# Patient Record
Sex: FEMALE | Race: WHITE | URBAN - METROPOLITAN AREA
[De-identification: names, ages, dates, MRNs, and addresses within clinical notes are randomized per-mention and may not be internally consistent; named-entity substitution may affect disease eponyms.]

---

## 2017-10-26 ENCOUNTER — HOSPITAL ENCOUNTER (OUTPATIENT)
Dept: MAMMOGRAPHY | Age: 62
Discharge: HOME | End: 2017-10-26
Attending: FAMILY MEDICINE | Admitting: FAMILY MEDICINE

## 2017-10-26 DIAGNOSIS — Z12.31 ENCOUNTER FOR SCREENING MAMMOGRAM FOR BREAST CANCER: ICD-10-CM

## 2024-02-06 ENCOUNTER — OFFICE VISIT (OUTPATIENT)
Age: 69
End: 2024-02-06

## 2024-02-06 VITALS
HEIGHT: 67 IN | DIASTOLIC BLOOD PRESSURE: 76 MMHG | SYSTOLIC BLOOD PRESSURE: 176 MMHG | OXYGEN SATURATION: 86 % | RESPIRATION RATE: 16 BRPM | BODY MASS INDEX: 39.3 KG/M2 | TEMPERATURE: 99.4 F | WEIGHT: 250.4 LBS | HEART RATE: 98 BPM

## 2024-02-06 DIAGNOSIS — R05.9 COUGH, UNSPECIFIED TYPE: Primary | ICD-10-CM

## 2024-02-06 ASSESSMENT — ENCOUNTER SYMPTOMS: COUGH: 1

## 2024-02-07 NOTE — PATIENT INSTRUCTIONS
NEED TO GO TO EMERGENCY ROOM FOR FURTHER EVALUATION. HER OXYGEN SATURATION LOW, HER CHEST WHEEZING MUCH

## 2024-02-07 NOTE — PROGRESS NOTES
Melanie Tobin (:  1955) is a 69 y.o. female,New patient, here for evaluation of the following chief complaint(s):  Cough (Cough, sinus issues, headache, sore throat chest and back pain from coughing x 2 days.)      ASSESSMENT/PLAN:  1. Cough, unspecified type    - POCT Influenza A/B DNA (Alere i) REFUSED TEST  - POCT COVID-19 Rapid, NAAT REFUSED TEST   -NEED TO GO TO EMERGENCY ROOM FOR FURTHER EVALUATION    Return if symptoms worsen or fail to improve.    SUBJECTIVE/OBJECTIVE:  PRESENT TO CLINIC WITH COUGH,FEVER, AND  CONGESTION FOR 2 DAYS. REFUSED FLU AND COVID TEST       History provided by:  Patient  Cough        Vitals:    24 1928   BP: (!) 176/76   Pulse: 98   Resp: 16   Temp: 99.4 °F (37.4 °C)   SpO2: (!) 86%   Weight: 113.6 kg (250 lb 6.4 oz)   Height: 1.702 m (5' 7\")       Review of Systems   HENT:  Positive for congestion.    Respiratory:  Positive for cough.        Physical Exam  Constitutional:       Appearance: Normal appearance.   HENT:      Head: Normocephalic and atraumatic.      Nose: Nose normal.      Mouth/Throat:      Mouth: Mucous membranes are moist.   Eyes:      Pupils: Pupils are equal, round, and reactive to light.   Pulmonary:      Breath sounds: Wheezing and rhonchi present.   Musculoskeletal:         General: Normal range of motion.      Cervical back: Normal range of motion and neck supple.   Neurological:      Mental Status: She is alert and oriented to person, place, and time.           An electronic signature was used to authenticate this note.    --Sonny Ordoñez DO

## 2024-09-22 ENCOUNTER — CLINICAL DOCUMENTATION (OUTPATIENT)
Dept: PHYSICAL THERAPY | Age: 69
End: 2024-09-22

## 2024-09-22 PROBLEM — I63.9 ACUTE CVA (CEREBROVASCULAR ACCIDENT) (HCC): Status: ACTIVE | Noted: 2024-09-22

## 2024-09-22 RX ORDER — UBIDECARENONE 75 MG
100 CAPSULE ORAL DAILY
Status: DISPENSED | OUTPATIENT
Start: 2024-09-23

## 2024-09-22 RX ORDER — LANOLIN ALCOHOL/MO/W.PET/CERES
3 CREAM (GRAM) TOPICAL NIGHTLY PRN
Status: DISCONTINUED | OUTPATIENT
Start: 2024-09-22 | End: 2024-09-25

## 2024-09-22 RX ORDER — GLUCAGON 1 MG/ML
1 KIT INJECTION PRN
Status: ACTIVE | OUTPATIENT
Start: 2024-09-22

## 2024-09-22 RX ORDER — ASPIRIN 81 MG/1
81 TABLET, CHEWABLE ORAL DAILY
Status: DISPENSED | OUTPATIENT
Start: 2024-09-23

## 2024-09-22 RX ORDER — ACETAMINOPHEN 325 MG/1
650 TABLET ORAL EVERY 4 HOURS PRN
Status: ACTIVE | OUTPATIENT
Start: 2024-09-22

## 2024-09-22 RX ORDER — AMLODIPINE BESYLATE 5 MG/1
5 TABLET ORAL DAILY
Status: DISCONTINUED | OUTPATIENT
Start: 2024-09-23 | End: 2024-09-25

## 2024-09-22 RX ORDER — INSULIN GLARGINE 100 [IU]/ML
11 INJECTION, SOLUTION SUBCUTANEOUS NIGHTLY
Status: DISCONTINUED | OUTPATIENT
Start: 2024-09-22 | End: 2024-09-26

## 2024-09-22 RX ORDER — HEPARIN SODIUM 5000 [USP'U]/ML
5000 INJECTION, SOLUTION INTRAVENOUS; SUBCUTANEOUS EVERY 8 HOURS SCHEDULED
Status: DISCONTINUED | OUTPATIENT
Start: 2024-09-22 | End: 2024-09-27

## 2024-09-22 RX ORDER — ONDANSETRON 4 MG/1
4 TABLET, ORALLY DISINTEGRATING ORAL EVERY 8 HOURS PRN
Status: ACTIVE | OUTPATIENT
Start: 2024-09-22

## 2024-09-22 RX ORDER — HYDRALAZINE HYDROCHLORIDE 10 MG/1
10 TABLET, FILM COATED ORAL EVERY 6 HOURS PRN
Status: ACTIVE | OUTPATIENT
Start: 2024-09-22

## 2024-09-22 RX ORDER — ATORVASTATIN CALCIUM 80 MG/1
80 TABLET, FILM COATED ORAL NIGHTLY
Status: DISPENSED | OUTPATIENT
Start: 2024-09-22

## 2024-09-22 RX ORDER — BISACODYL 10 MG
10 SUPPOSITORY, RECTAL RECTAL DAILY PRN
Status: ACTIVE | OUTPATIENT
Start: 2024-09-22

## 2024-09-22 RX ORDER — INSULIN LISPRO 100 [IU]/ML
0-4 INJECTION, SOLUTION INTRAVENOUS; SUBCUTANEOUS NIGHTLY
Status: ACTIVE | OUTPATIENT
Start: 2024-09-22

## 2024-09-22 RX ORDER — DEXTROSE MONOHYDRATE 100 MG/ML
INJECTION, SOLUTION INTRAVENOUS CONTINUOUS PRN
Status: ACTIVE | OUTPATIENT
Start: 2024-09-22

## 2024-09-22 RX ORDER — POLYETHYLENE GLYCOL 3350 17 G/17G
17 POWDER, FOR SOLUTION ORAL 2 TIMES DAILY
Status: DISPENSED | OUTPATIENT
Start: 2024-09-22

## 2024-09-22 RX ORDER — INSULIN LISPRO 100 [IU]/ML
0-4 INJECTION, SOLUTION INTRAVENOUS; SUBCUTANEOUS
Status: DISPENSED | OUTPATIENT
Start: 2024-09-22

## 2024-09-22 NOTE — CARE COORDINATION
encephalopathy  --underlying dementia/cognitive impairment? Neuro recommends OP neuropsych testing  --EEG w/ some slowing but no epileptiform activity  --B12 is low normal -- replacing. Thiamine level pending. ANCAs pending     Ammonia level WNL. UA negative. UDS negative. TSH WNL     Accelerated HTN   --holding ARB for now  --started low-dose norvasc     Acute kidney injury -- on CKD? Baseline unknown  --trend labs. Stable today ~1.7     DM2  --SSI  --poorly controlled -- lantus dose increased      Diet at discharge from previous hospital:    Diet Consistent Carb 60 g/meal     Anticoagulation at discharge from previous hospital:    Heparin 5,000u Q8H SQ    Code Status from previous hospital:    Full        AJ SchwartzN, .[phone]

## 2024-09-23 ENCOUNTER — HOSPITAL ENCOUNTER (INPATIENT)
Age: 69
LOS: 10 days | Discharge: HOME HEALTH | DRG: 057 | End: 2024-10-03
Attending: STUDENT IN AN ORGANIZED HEALTH CARE EDUCATION/TRAINING PROGRAM | Admitting: STUDENT IN AN ORGANIZED HEALTH CARE EDUCATION/TRAINING PROGRAM
Payer: MEDICARE

## 2024-09-23 LAB
GLUCOSE BLD-MCNC: 209 MG/DL (ref 70–99)
GLUCOSE BLD-MCNC: 99 MG/DL (ref 70–99)
PERFORMED ON: ABNORMAL
PERFORMED ON: NORMAL

## 2024-09-23 PROCEDURE — 6360000002 HC RX W HCPCS: Performed by: STUDENT IN AN ORGANIZED HEALTH CARE EDUCATION/TRAINING PROGRAM

## 2024-09-23 PROCEDURE — 6370000000 HC RX 637 (ALT 250 FOR IP): Performed by: STUDENT IN AN ORGANIZED HEALTH CARE EDUCATION/TRAINING PROGRAM

## 2024-09-23 PROCEDURE — 1280000000 HC REHAB R&B

## 2024-09-23 RX ADMIN — HEPARIN SODIUM 5000 UNITS: 5000 INJECTION INTRAVENOUS; SUBCUTANEOUS at 20:42

## 2024-09-23 RX ADMIN — ATORVASTATIN CALCIUM 80 MG: 80 TABLET, FILM COATED ORAL at 20:43

## 2024-09-23 RX ADMIN — INSULIN GLARGINE 11 UNITS: 100 INJECTION, SOLUTION SUBCUTANEOUS at 20:35

## 2024-09-23 NOTE — PLAN OF CARE
ARU PATIENT TREATMENT PLAN  [facility]  [address] [place] [address]  [phone]      Melanie Tobin    : 1955  [facility] #: [id number]  MRN: [id number]  PHYSICIAN:  Liyah Meeks MD  Primary Problem    Patient Active Problem List   Diagnosis    Chest pain    Headache    Diabetes mellitus type 2, uncontrolled    Acute CVA (cerebrovascular accident) (HCC)       Rehabilitation Diagnosis:      #. Left frontal and thalamic ischemic CVA  - Suspected etiology is small vessel disease.  - MRI w/ punctate acute cortical infarct in the parasagittal left frontal lobe and in the medial left thalamus.  - Continue aspirin 81 mg daily, atorvastatin 80 mg nightly  - Optimization of comorbid HTN and DM2  - SLP for cog/language     #. Encephalopathy  - Right temporal slowing noted on EEG, but no epileptiform activity  - LP deferred at outside hospital  - Continue vitamin B12 supplementation  - ANCA panel, thiamine levels - pending (from [facility])  - Outpatient neuropsychological testing has been recommended     #. DARA vs CKD?  - Cr 1.7 at [facility], unknown baseline  - Recheck BMP tomorrow,      #.  HTN  - Continue amlodipine 5 mg daily  -Hydralazine 10 mg every 6 hours as needed for SBP >180 mmHg     #. DM2  - Lantus 11 units nightly  - SSI     ADMIT DATE:2024    Patient Goals: Return to PLOF.  Admitting Impairments: Stroke - 1.2 - Right Body Involvement (Left Brain)  Activities: Impaired Eating, Hygiene, Toileting, Bathing, Dressing, Bed Mobility, Transfers, Ambulation, Stairs, and Endurance.  Participation: Prior to admission patient was living at home with spouse, was independent with all mobility and activities, was an active .     CARE PLAN     NURSING:  Melanie Tobin while on this unit will:  [x] Be continent of bowel and bladder     [x] Have an adequate number of bowel movements  [x] Urinate with no urinary retention >300ml in bladder  [] Complete bladder protocol with ricks  lower body ADL at supervision   Patient will complete toileting at supervision   Patient will complete functional transfers at supervision   Patient to gather and transport IADL items at supervision     These goals were reviewed with this patient at the time of assessment and Melanie Tobin is in agreement    Plan of Care:  Pt to be seen 5 out of 7 days per week per ARU protocol ( 60 minutes with OT)     SPEECH THERAPY: Goals will be left blank if speech is not following this patient.  Patient Goals: Pt unable to express goals    Time Frame: 2-3 weeks      Pt will complete auditory comprehension tasks with 80% accuracy.   Pt will complete expressive language tasks with 80% accuracy or min cues   Pt will verbalize basic information (personal history etc.) with 80% accuracy or min cues using any communication modality.  Patient will complete verbal description and word retrieval tasks with 80% accuracy or given min verbal cues  Patient will demonstrate insight into limitations and impact on daily functioning with min cues.    Plan of Care:  Pt to be seen 5 out of 7 days per week per ARU protocol ( 60 minutes with SLP)    Therapy Treatments will include:  [x]  therapeutic exercises    [x]  gait training     [x]  neuromuscular re-ed                            [x]  transfer training             [x] community reintegration    [x] bed mobility                          []  w/c mobility and training  [x]  self care    [x]home mgmt    [x]  cognitive training            [x]  energy conservation        []  dysphagia tx    [x]  speech/language/communication therapy   [x]  group therapy    [x]  patient/family education    [] Other:    CASE MANAGEMENT:  Goals:  Assist patient/family with discharge planning, patient/family counseling, and coordination with insurance during ARU stay.       Melanie Tobin will be seen a minimum of 3 hours of therapy per day, a minimum of 5 out of 7 days per week  (please see above for specific

## 2024-09-23 NOTE — PROGRESS NOTES
Patient was admitted to room 4903 at 1500.  Patient was oriented to the Call Light, Phone, TV, Thermostat, Bed Controls, Bathroom and Emergency Cord.  Patient verbalized and demonstrated understanding of all.  Patient was also given an overview of Unit Routines for Acute Rehab, including the patient's rights and responsibilities as well as the CMS IRF [employer] Privacy Act Statement providing notice of data collection.  Patient states that their normal bowel regime is pt unable to recall . Meal times were explained, including how to order food.  The white board, (which is posted on the wall by the door is used for communication) has the Therapy Scheduled that is posted each day along with the name of your doctor, nurse, and therapist for your convenience.  We recommend any family that will be care givers or any care givers the patient has, take part in therapy.  We have no set visiting hours, we suggest non-caregiver friends and family visitors come after therapy (at 4 PM or later) to allow patient to rest in between sessions.      In conjunction with the patient and patient’s family, this nurse worked to establish a tailored Fall TIPS plan to ensure patient safety and compliance:    Falls TIPS Completion    Patient identified as increased risk for harm if fall:  [x] Yes     Fall Risks  History of Falls:    [] Yes   Medication Side Effects:   [x] Yes   Walking Aid:    [x] Yes   IV Pole or Equipment:   [] Yes   Unsteady Walk:     [x] Yes   May Forget or Choose Not to Call: [x] Yes     Fall Interventions   Communicate Recent Fall and/or Risk of Harm: [x] Yes   Walking Aids:  Crutches: [] Yes   Cane: [] Yes   Walker: [x] Yes   IV Assistance When Walking: [] Yes   Toileting Schedule: Every 2 Hours  Bedpan:   [] Yes   Assist to Commode: [] Yes   Assist to Bathroom: [x] Yes   Bed Alarm On: [] Yes   Assistance Out of Bed:  Bedrest: [] Yes   1 Person: [x] Yes   2 People: [] Yes

## 2024-09-23 NOTE — PROGRESS NOTES
2 (possible score 0-27, or enter 99 if unable to complete (if symptom frequency (column 2) is blank for 3 or more items).   9     Social Isolation  \"How often do you feel lonely or isolated from those around you?\"  [] 0.  Never  [] 1.  Rarely  [] 2.  Sometimes  [] 3.  Often  [] 4.  Always  [] 7.  Patient declines to respond  [x] 8.  Patient unable to respond    Pain Effect on Sleep  \"Over the past 5 days, how much of the time has pain made it hard for you to sleep at night?\"  []  0.  Does not apply - I have not had any pain or hurting in the past 5 days  []  1.  Rarely or not at all  []  2.  Occasionally  []  3.  Frequently  []  4.  Almost constantly  [x]  8.  Unable to answer    **If the patient answers \"0.  Does not apply\" to this question, skip the next two \"Pain Effect...\" questions**    Pain Interference with Therapy Activities  \"Over the past 5 days, how often have you limited your participation in rehabilitation therapy sessions due to pain?\"  []  0.  Does not apply - I have not received rehabilitation therapy in the past 5 days  []  1.  Rarely or not at all  []  2.  Occasionally  []  3.  Frequently  []  4.  Almost constantly  [x]  8.  Unable to answer    Pain Interference with Day-to-Day Activities:  \"Over the past 5 days, how often have you limited your day-to-day activities (excluding rehabilitation therapy session)?\"  []  1.  Rarely or not at all  []  2.  Occasionally  []  3.  Frequently  []  4.  Almost constantly  [x]  8.  Unable to answer    Nutritional Approaches  Check all of the following nutritional approaches that apply on admission:  []  A.  Parenteral/IV feeding (including IV fluids if needed for hydration, but not as part of dialysis/chemo)  []  B.  Feeding tube (e.g., nasogastric or abdominal (PEG))  []  C.  Mechanically altered diet - requires change in texture of food or liquids (e.g., pureed food, thickened liquids)  [x]  D.  Therapeutic diet (e.g., low salt, diabetic, low cholesterol)  []   Z.  None of the above    High Risk Drug Classes:  Use and Indication    Is taking: Check if the pt is taking any medications by pharmacological classification, not how it is used, in the following classes  Indication noted: If column 1 is checked, check if there is an indication noted for all meds in the drug class Is taking  (check all that apply) Indication noted (check all that apply)   Antipsychotic [] []   Anticoagulant [x] [x]   Antibiotic [] []   Opioid [] []   Antiplatelet [x] [x]   Hypoglycemic (including insulin) [x] [x]   None of the above []     Special Treatments, Procedures, and Programs    Check all of the following treatments, procedures, and programs that apply on admission. On admission (check all that apply)   Cancer Treatments   A1. Chemotherapy []           A2. IV []           A3. Oral []           A10. Other []   B1. Radiation []   Respiratory Therapies   C1. Oxygen Therapy []           C2. Continuous (continuously for at least 14 hours per day) []           C3. Intermittent []           C4. High-concentration []   D1. Suctioning (Does not include oral suctioning) []           D2. Scheduled []           D3. As needed []   E1. Tracheostomy Care []   F1. Invasive Mechanical Ventilator (ventilator or respirator) []   G1. Non-invasive Mechanical Ventilator []           G2. BiPAP []           G3. CPAP []   Other   H1. IV Medications (Do not include sub Q pumps, flushes, Dextrose 50% or lactated ringers) []           H2. Vasoactive medications []           H3. Antibiotics []           H4. Anticoagulation []           H10. Other []   I1. Transfusions []   J1. Dialysis []           J2. Hemodialysis []           J3. Peritoneal dialysis []   O1. IV access (including a catheter in a vein) []           O2. Peripheral []           O3. Midline []           O4. Central (PICC, tunneled, port) []      None of the above (select if no Cancer, Respiratory, or Other boxes are checked) [x]     The above items have

## 2024-09-23 NOTE — PROGRESS NOTES
4 Eyes Skin Assessment     NAME:  Melanie Tobin  YOB: 1955  MEDICAL RECORD NUMBER:  [id number]    The patient is being assessed for  Admission    I agree that at least one RN has performed a thorough Head to Toe Skin Assessment on the patient. ALL assessment sites listed below have been assessed.      Areas assessed by both nurses:    Head, Face, Ears, Shoulders, Back, Chest, Arms, Elbows, Hands, Sacrum. Buttock, Coccyx, Ischium, Legs. Feet and Heels, and Under Medical Devices         Does the Patient have a Wound? No noted wound(s)       Wally Prevention initiated by RN: No  Wound Care Orders initiated by RN: No    Pressure Injury (Stage 3,4, Unstageable, DTI, NWPT, and Complex wounds) if present, place Wound referral order by RN under : No    New Ostomies, if present place, Ostomy referral order under : No     Nurse 1 eSignature: Electronically signed by Marah Crespo RN on 9/23/24 at 3:24 PM EDT    **SHARE this note so that the co-signing nurse can place an eSignature**    Nurse 2 eSignature: Electronically signed by Jacqueline Kerns RN on 9/23/24 at 3:44 PM EDT

## 2024-09-24 LAB
GLUCOSE BLD-MCNC: 143 MG/DL (ref 70–99)
GLUCOSE BLD-MCNC: 175 MG/DL (ref 70–99)
GLUCOSE BLD-MCNC: 195 MG/DL (ref 70–99)
GLUCOSE BLD-MCNC: 222 MG/DL (ref 70–99)
PERFORMED ON: ABNORMAL

## 2024-09-24 PROCEDURE — 1280000000 HC REHAB R&B

## 2024-09-24 PROCEDURE — 97116 GAIT TRAINING THERAPY: CPT

## 2024-09-24 PROCEDURE — 6370000000 HC RX 637 (ALT 250 FOR IP): Performed by: STUDENT IN AN ORGANIZED HEALTH CARE EDUCATION/TRAINING PROGRAM

## 2024-09-24 PROCEDURE — 92610 EVALUATE SWALLOWING FUNCTION: CPT

## 2024-09-24 PROCEDURE — 92523 SPEECH SOUND LANG COMPREHEN: CPT

## 2024-09-24 PROCEDURE — 97162 PT EVAL MOD COMPLEX 30 MIN: CPT

## 2024-09-24 PROCEDURE — 6360000002 HC RX W HCPCS: Performed by: STUDENT IN AN ORGANIZED HEALTH CARE EDUCATION/TRAINING PROGRAM

## 2024-09-24 PROCEDURE — 97166 OT EVAL MOD COMPLEX 45 MIN: CPT

## 2024-09-24 PROCEDURE — 97535 SELF CARE MNGMENT TRAINING: CPT

## 2024-09-24 PROCEDURE — 97530 THERAPEUTIC ACTIVITIES: CPT

## 2024-09-24 RX ADMIN — INSULIN GLARGINE 11 UNITS: 100 INJECTION, SOLUTION SUBCUTANEOUS at 21:40

## 2024-09-24 RX ADMIN — HEPARIN SODIUM 5000 UNITS: 5000 INJECTION INTRAVENOUS; SUBCUTANEOUS at 05:41

## 2024-09-24 RX ADMIN — POLYETHYLENE GLYCOL 3350 17 G: 17 POWDER, FOR SOLUTION ORAL at 09:17

## 2024-09-24 RX ADMIN — ASPIRIN 81 MG: 81 TABLET, CHEWABLE ORAL at 09:17

## 2024-09-24 RX ADMIN — POLYETHYLENE GLYCOL 3350 17 G: 17 POWDER, FOR SOLUTION ORAL at 21:40

## 2024-09-24 RX ADMIN — ATORVASTATIN CALCIUM 80 MG: 80 TABLET, FILM COATED ORAL at 21:40

## 2024-09-24 RX ADMIN — AMLODIPINE BESYLATE 5 MG: 5 TABLET ORAL at 09:17

## 2024-09-24 RX ADMIN — VITAM B12 100 MCG: 100 TAB at 09:17

## 2024-09-24 NOTE — PROGRESS NOTES
regarding role of SLP, results of assessment, recommendations and general speech pathology plan of care.   Learning Assessment: Pt requires ongoing learning   Pt with limited comprehension     Assessment  Impairments Requiring Therapeutic Intervention: Global Aphasia   Prognosis: fair - severity of deficit    Clinical Assessment:  Pt presents with 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 Pt demonstrates moderate impairments in basic comprehension tasks and severe impairments in more complex comprehension tasks. Pt with error patterns in prolonged responses which required simplification and repetition. Pt demonstrates moderate impairments in basic verbal expression tasks and severe impairments in more complex verbal expression tasks. Pt demonstrates accurate word production with a non-fluent pattern and impairments in word finding. Cognitive domains seemingly impacted by pt's language based deficits are attention, memory, and orientation. Dysphagia assessment also completed this date. Swallow mechanism appears functional, no further dysphagia indicated. Pt would benefit from participation in speech therapy services to improve comprehension and expression.       Diet Solids Recommendation:   Liquid Consistency Recommendation:   Recommended Form of Meds:   Regular texture diet     Thin liquids     Meds whole with water           Plan  Frequency: 60 minutes/day; 5 days per week, as tolerated, until goals met, or discharged from ARU.  Therapeutic Interventions:  Expressive/ Receptive Language intervention , Compensatory Cognitive intervention , and Patient/ Family education     Discharge  Barriers to discharge: May need additional assistance to manage medications and/or finances (assistance/supervision)  Inability to effectively communicate in emergent situations (ex: calling 911, stating name, reduced intelligibility, etc)  Discharge Recommendations: 24 hour supervision  and TBD   Continued SLP at Discharge: Yes     Goals  Patient Goals: Pt unable to express goals    Time Frame: 2-3 weeks      Pt will complete auditory comprehension tasks with 80% accuracy.   Pt will complete expressive language tasks with 80% accuracy or min cues   Pt will verbalize basic information (personal history etc.) with 80% accuracy or min cues using any communication modality.  Patient will complete verbal description and word retrieval tasks with 80% accuracy or given min verbal cues  Patient will demonstrate insight into limitations and impact on daily functioning with min cues.    Above goals reviewed on 9/24/2024. All goals are ongoing at this time unless indicated above.       Therapy Session Time      Session 1 Session 2   Time In 0800    Time Out 0900    Time Code Minutes 0    Individual Minutes 60      Timed Code Treatment Minutes:  0  Total Treatment Minutes:  60    Electronically Signed By:   AJ Matson,  Clinician  Speech Language Pathology    Speech Language Pathologist observed and directed patient's plan of care. Co-signed and supervised by:    Taya oJhnson M.A. [facility] SJanePJane [id number]  Speech-Language Pathologist   9/24/2024 9:56 AM

## 2024-09-24 NOTE — PROGRESS NOTES
Admission Period/Goal QM Codes for Melanie Tobin.    QM Admit Code Goal Code   Eating 4 6   Oral Hygiene 4 6   Toileting Hygiene 3 6   Shower/Bathing 3 6   UB Dressing 4 6   LB Dressing 3 6   Putting on/off Footwear 4 6   Rolling Left and Right 4 6   Sit To Lying 4 6   Lying to Sitting on Bedside 4 6   Sit to Stand 3 6   Chair/Bed to Chair Transfer 4 6   Toilet Transfers 4 6   Car Transfers 3 6   Walk 10 Feet 4 6   Walk 50 Feet with Two Turns 88 6   Walk 150 Feet 88 6   Walk 10 Feet on Uneven Surfaces 4 6   1 Step (Curb) 4 6   4 Steps 4 6   12 Steps 88 6   Picking up Object from Floor 4 6   Wheel 50 Feet with 2 Turns 9 -   Type - -   Wheel 150 Feet 9 -   Type - -       The above codes were determined by the treatment team to be the patient's accurate admission assessment codes based on assessment performed soon after the patient's admission and prior to the benefit of services provided by staff, or if appropriate, the patient's usual performance at admission.    OT:  Lisa Gramajo OTR/L, CNS ([id number]) 9/26/2024 12:32 PM     PT:  Christoph Brown, PROT [id number] 9/26/2024 1326     RN:  Gail Blancas RN 9/27/2024 1153     ST:  Taya Johnson MA CCC-SLP 9/27/2024 1235     :  Iván Coughlin PT, DPT [id number]  9/27/24  12:47 PM

## 2024-09-24 NOTE — CONSULTS
Nutrition Note    RECOMMENDATIONS  PO Diet: CCC-4  ONS: If po intakes of meals are consistently <50%, please order Glucerna BID  ASSESSMENT   Consult received for new admission to ARU.  Admission MST score of 2 indicates pt with wt loss and decreased oral intake prior to admission.  Pt unable to answer RD questions regarding wt hx,  lb.  Review of wt records show that pt was 250 lb back in February, indicating an insignificant loss of 7 lb / 3% over 7 months.  Will follow PO intake and initiate oral nutrition supplement if po intake consistently less than 50% per meal.       Malnutrition Status: No malnutrition  Acute Illness      NUTRITION DIAGNOSIS   Increased nutrient needs related to increase demand for energy/nutrients as evidenced by in ARU for strength and conditioning    Goals: PO intake 50% or greater, by next RD assessment     NUTRITION RELATED FINDINGS  Objective: aphasic; flat affect; 9/22 LBM; POCG 143  Wounds: None    CURRENT NUTRITION THERAPIES  ADULT DIET; Regular; 4 carb choices (60 gm/meal)       PO Intake: Unable to assess   PO Supplement Intake:Unable to assess      ANTHROPOMETRICS  Current Height: 167.6 cm (5' 5.98\")  Current Weight - Scale: 110.2 kg (243 lb)    Ideal Body Weight (IBW): 130 lbs  (59 kg)        BMI: 39.2      COMPARATIVE STANDARDS  Total Energy Requirements (kcals/day): 1639 - 1967     Protein (g):  110       Fluid (mL/day):  1639 - 1967    EDUCATION  Education not indicated     The patient will be monitored per nutrition standards of care. Consult dietitian if additional nutrition interventions are needed prior to RD reassessment.     MITCH OLVERA, TABATHA, LD    Contact: [phone]

## 2024-09-24 NOTE — PROGRESS NOTES
[facility] - Inpatient Rehabilitation Department   Phone: [phone]    Physical Therapy    [x] Initial Evaluation            [] Daily Treatment Note         [] Discharge Summary      Patient: Melanie Tobin   : 1955   MRN: [id number]   Date of Service:  2024  Admitting Diagnosis: Acute CVA (cerebrovascular accident) (HCC)  Current Admission Summary: Melanie Tobin is a 69 y.o.  who presented to the ED on  for c/o Hyperglycemia and AMS.  Patient has a PMHx: DM II and no PSHx on file.  Upon ED evaluation patient was a poor historian with altered mental status and unable to provide any information and history was provided by spouse.   stated she was having gradual cognitive changes for several months now.  He reported patient has been having difficulty with memory and having episodes of confusion.  However, the last 4 to 5 days her confusion has gotten worse to the point where she is not making sense of her speech.  Patient was taken to the emergency department at a different hospital by sister.  Was discharged with questionable urinary tract infection.  Patient followed up with primary care physician and was placed on antibiotics for urinary tract infection.  Patient has been sleeping more per .  She has also been having elevated blood pressure, poor appetite, yet increased blood glucose levels.  Upon ED evaluation she answered her name correctly. When asked other questions she stated, \"You sound like my mom and sister, all you have into sit on the roof if you want\".  She was easily distracted and tangential.   denied she had any fever, chills, cough, chest pain, or SOB.  Blood values were significant for BUN/Cr of 27/2.03; Glucose of 246; Ammonia level 23; UA had protein of 200 glucose of 100; Toxicology was negative.  CBC was normal and CT Head did not show any acute abnormality. MRI Brain was significant for acute punctate infarcts in left frontal lobe and left

## 2024-09-24 NOTE — PLAN OF CARE
Problem: Discharge Planning  Goal: Discharge to home or other facility with appropriate resources  Outcome: Progressing     Problem: Chronic Conditions and Co-morbidities  Goal: Patient's chronic conditions and co-morbidity symptoms are monitored and maintained or improved  Outcome: Progressing     Problem: Skin/Tissue Integrity  Goal: Absence of new skin breakdown  Description: 1.  Monitor for areas of redness and/or skin breakdown  2.  Assess vascular access sites hourly  3.  Every 4-6 hours minimum:  Change oxygen saturation probe site  4.  Every 4-6 hours:  If on nasal continuous positive airway pressure, respiratory therapy assess nares and determine need for appliance change or resting period.  Outcome: Progressing     Problem: Safety - Adult  Goal: Free from fall injury  Outcome: Progressing     Problem: ABCDS Injury Assessment  Goal: Absence of physical injury  Outcome: Progressing     Problem: Confusion  Goal: Confusion, delirium, dementia, or psychosis is improved or at baseline  Description: INTERVENTIONS:  1. Assess for possible contributors to thought disturbance, including medications, impaired vision or hearing, underlying metabolic abnormalities, dehydration, psychiatric diagnoses, and notify attending LIP  2. Nada high risk fall precautions, as indicated  3. Provide frequent short contacts to provide reality reorientation, refocusing and direction  4. Decrease environmental stimuli, including noise as appropriate  5. Monitor and intervene to maintain adequate nutrition, hydration, elimination, sleep and activity  6. If unable to ensure safety without constant attention obtain sitter and review sitter guidelines with assigned personnel  7. Initiate Psychosocial CNS and Spiritual Care consult, as indicated  Outcome: Progressing

## 2024-09-24 NOTE — H&P
Patient: Melanie Tobin  [id number]  Date: 9/24/2024    Chief Complaint: CVA    History of Present Illness/Hospital Course:  Melanie Tobin is a 69 y.o.  who presented to the ED on 9/18 for c/o Hyperglycemia and AMS.  Patient has a PMHx: DM II and no PSHx on file.  Upon ED evaluation patient was a poor historian with altered mental status and unable to provide any information and history was provided by spouse.   stated she was having gradual cognitive changes for several months now.  He reported patient has been having difficulty with memory and having episodes of confusion.  However, the last 4 to 5 days her confusion has gotten worse to the point where she is not making sense of her speech.  Patient was taken to the emergency department at a different hospital by sister.  Was discharged with questionable urinary tract infection.  Patient followed up with primary care physician and was placed on antibiotics for urinary tract infection.  Patient has been sleeping more per .  She has also been having elevated blood pressure, poor appetite, yet increased blood glucose levels.  Upon ED evaluation she answered her name correctly. When asked other questions she stated, \"You sound like my mom and sister, all you have into sit on the roof if you want\".  She was easily distracted and tangential.   denied she had any fever, chills, cough, chest pain, or SOB.  Blood values were significant for BUN/Cr of 27/2.03; Glucose of 246; Ammonia level 23; UA had protein of 200 glucose of 100; Toxicology was negative.  CBC was normal and CT Head did not show any acute abnormality. MRI Brain was significant for acute punctate infarcts in left frontal lobe and left medial thalamus. SLP evaluated patient on 9/19, and she did not demonstrate any s/s of aspiration, and she was placed on a regular ADA diet.  Additionally, SLP performed a Cognitive assessment and noted moderate fluent expressive aphasia characterized by

## 2024-09-24 NOTE — PROGRESS NOTES
[facility] - Inpatient Rehabilitation Department   Phone: [phone]    Occupational Therapy    [x] Initial Evaluation            [] Daily Treatment Note         [] Discharge Summary      Patient: Melanie Tobin   : 1955   MRN: [id number]   Date of Service:  2024    Admitting Diagnosis:  Acute CVA (cerebrovascular accident) (HCC)  Current Admission Summary: Melanie Tobin is a 69 y.o.  who presented to the ED on  for c/o Hyperglycemia and AMS.  Patient has a PMHx: DM II and no PSHx on file.  Upon ED evaluation patient was a poor historian with altered mental status and unable to provide any information and history was provided by spouse.   stated she was having gradual cognitive changes for several months now.  He reported patient has been having difficulty with memory and having episodes of confusion.  However, the last 4 to 5 days her confusion has gotten worse to the point where she is not making sense of her speech.  Patient was taken to the emergency department at a different hospital by sister.  Was discharged with questionable urinary tract infection.  Patient followed up with primary care physician and was placed on antibiotics for urinary tract infection.  Patient has been sleeping more per .  She has also been having elevated blood pressure, poor appetite, yet increased blood glucose levels.  Upon ED evaluation she answered her name correctly. When asked other questions she stated, \"You sound like my mom and sister, all you have into sit on the roof if you want\".  She was easily distracted and tangential.   denied she had any fever, chills, cough, chest pain, or SOB.  Blood values were significant for BUN/Cr of 27/2.03; Glucose of 246; Ammonia level 23; UA had protein of 200 glucose of 100; Toxicology was negative.  CBC was normal and CT Head did not show any acute abnormality. MRI Brain was significant for acute punctate infarcts in left frontal lobe and  call light within reach, patient at risk for falls, and telesitter in use    Plan  Frequency: 5 x/week, 60 min/day  Current Treatment Recommendations: strengthening, balance training, transfer training, endurance training, neuromuscular re-education, patient/caregiver education, ADL/self-care training, IADL training, home management training, cognitive/perceptual training, cognitive reorientation, home exercise program, safety education, equipment evaluation/education, and positioning    Goals  Patient Goals: did not state   Short Term Goals:  Time Frame: 10-14 days  Patient will complete lower body ADL at supervision   Patient will complete toileting at supervision   Patient will complete functional transfers at supervision   Patient to gather and transport IADL items at supervision     Above goals reviewed on 9/24/2024.  All goals are ongoing at this time unless indicated above.       Therapy Session Time     Individual Group Co-treatment   Time In 1330      Time Out 1500      Minutes 90           Timed Code Treatment Minutes: 75  Total Treatment Minutes: 90       Electronically Signed By: LIZZETTE Galo MOT OTR/L, [id number] 9/24/2024 3:25 PM

## 2024-09-24 NOTE — PLAN OF CARE
Problem: Discharge Planning  Goal: Discharge to home or other facility with appropriate resources  9/24/2024 0951 by Niharika Uriostegui RN  Outcome: Progressing  9/23/2024 2105 by Lia Abbott RN  Outcome: Progressing     Problem: Chronic Conditions and Co-morbidities  Goal: Patient's chronic conditions and co-morbidity symptoms are monitored and maintained or improved  9/24/2024 0951 by Niharika Uriostegui RN  Outcome: Progressing  9/23/2024 2105 by Lia Abbott RN  Outcome: Progressing     Problem: Skin/Tissue Integrity  Goal: Absence of new skin breakdown  Description: 1.  Monitor for areas of redness and/or skin breakdown  2.  Assess vascular access sites hourly  3.  Every 4-6 hours minimum:  Change oxygen saturation probe site  4.  Every 4-6 hours:  If on nasal continuous positive airway pressure, respiratory therapy assess nares and determine need for appliance change or resting period.  9/24/2024 0951 by Niharika Uriostegui RN  Outcome: Progressing  9/23/2024 2105 by Lia Abbott RN  Outcome: Progressing     Problem: Safety - Adult  Goal: Free from fall injury  9/24/2024 0951 by Niharika Uriostegui RN  Outcome: Progressing  9/23/2024 2105 by Lia Abbott RN  Outcome: Progressing     Problem: ABCDS Injury Assessment  Goal: Absence of physical injury  9/24/2024 0951 by Niharika Uriostegui RN  Outcome: Progressing  9/23/2024 2105 by Lia Abbott RN  Outcome: Progressing     Problem: Confusion  Goal: Confusion, delirium, dementia, or psychosis is improved or at baseline  Description: INTERVENTIONS:  1. Assess for possible contributors to thought disturbance, including medications, impaired vision or hearing, underlying metabolic abnormalities, dehydration, psychiatric diagnoses, and notify attending LIP  2. [place] high risk fall precautions, as indicated  3. Provide frequent short contacts to provide reality reorientation, refocusing and direction  4. Decrease environmental stimuli, including noise as

## 2024-09-25 LAB
ANION GAP SERPL CALCULATED.3IONS-SCNC: 13 MMOL/L (ref 3–16)
BASOPHILS # BLD: 0.1 K/UL (ref 0–0.2)
BASOPHILS NFR BLD: 0.6 %
BUN SERPL-MCNC: 28 MG/DL (ref 7–20)
CALCIUM SERPL-MCNC: 8.9 MG/DL (ref 8.3–10.6)
CHLORIDE SERPL-SCNC: 104 MMOL/L (ref 99–110)
CO2 SERPL-SCNC: 23 MMOL/L (ref 21–32)
CREAT SERPL-MCNC: 1.7 MG/DL (ref 0.6–1.2)
DEPRECATED RDW RBC AUTO: 14 % (ref 12.4–15.4)
EOSINOPHIL # BLD: 0.2 K/UL (ref 0–0.6)
EOSINOPHIL NFR BLD: 1.4 %
GFR SERPLBLD CREATININE-BSD FMLA CKD-EPI: 32 ML/MIN/{1.73_M2}
GLUCOSE BLD-MCNC: 151 MG/DL (ref 70–99)
GLUCOSE BLD-MCNC: 197 MG/DL (ref 70–99)
GLUCOSE BLD-MCNC: 227 MG/DL (ref 70–99)
GLUCOSE BLD-MCNC: 262 MG/DL (ref 70–99)
GLUCOSE SERPL-MCNC: 172 MG/DL (ref 70–99)
HCT VFR BLD AUTO: 33.6 % (ref 36–48)
HGB BLD-MCNC: 11 G/DL (ref 12–16)
LYMPHOCYTES # BLD: 2.3 K/UL (ref 1–5.1)
LYMPHOCYTES NFR BLD: 22.1 %
MCH RBC QN AUTO: 29.2 PG (ref 26–34)
MCHC RBC AUTO-ENTMCNC: 32.8 G/DL (ref 31–36)
MCV RBC AUTO: 88.9 FL (ref 80–100)
MONOCYTES # BLD: 0.7 K/UL (ref 0–1.3)
MONOCYTES NFR BLD: 6.6 %
NEUTROPHILS # BLD: 7.3 K/UL (ref 1.7–7.7)
NEUTROPHILS NFR BLD: 69.3 %
PERFORMED ON: ABNORMAL
PLATELET # BLD AUTO: 206 K/UL (ref 135–450)
PMV BLD AUTO: 11.9 FL (ref 5–10.5)
POTASSIUM SERPL-SCNC: 4.3 MMOL/L (ref 3.5–5.1)
RBC # BLD AUTO: 3.78 M/UL (ref 4–5.2)
SODIUM SERPL-SCNC: 140 MMOL/L (ref 136–145)
WBC # BLD AUTO: 10.6 K/UL (ref 4–11)

## 2024-09-25 PROCEDURE — 97530 THERAPEUTIC ACTIVITIES: CPT

## 2024-09-25 PROCEDURE — 6370000000 HC RX 637 (ALT 250 FOR IP): Performed by: STUDENT IN AN ORGANIZED HEALTH CARE EDUCATION/TRAINING PROGRAM

## 2024-09-25 PROCEDURE — 80048 BASIC METABOLIC PNL TOTAL CA: CPT

## 2024-09-25 PROCEDURE — 36415 COLL VENOUS BLD VENIPUNCTURE: CPT

## 2024-09-25 PROCEDURE — 1280000000 HC REHAB R&B

## 2024-09-25 PROCEDURE — 85025 COMPLETE CBC W/AUTO DIFF WBC: CPT

## 2024-09-25 PROCEDURE — 92507 TX SP LANG VOICE COMM INDIV: CPT

## 2024-09-25 PROCEDURE — 97535 SELF CARE MNGMENT TRAINING: CPT

## 2024-09-25 PROCEDURE — 97116 GAIT TRAINING THERAPY: CPT

## 2024-09-25 PROCEDURE — 6360000002 HC RX W HCPCS: Performed by: STUDENT IN AN ORGANIZED HEALTH CARE EDUCATION/TRAINING PROGRAM

## 2024-09-25 RX ORDER — LANOLIN ALCOHOL/MO/W.PET/CERES
3 CREAM (GRAM) TOPICAL NIGHTLY
Status: DISPENSED | OUTPATIENT
Start: 2024-09-25

## 2024-09-25 RX ORDER — AMLODIPINE BESYLATE 5 MG/1
10 TABLET ORAL DAILY
Status: DISPENSED | OUTPATIENT
Start: 2024-09-26

## 2024-09-25 RX ORDER — TRAZODONE HYDROCHLORIDE 50 MG/1
50 TABLET, FILM COATED ORAL NIGHTLY PRN
Status: ACTIVE | OUTPATIENT
Start: 2024-09-25

## 2024-09-25 RX ORDER — AMLODIPINE BESYLATE 5 MG/1
5 TABLET ORAL ONCE
Status: COMPLETED | OUTPATIENT
Start: 2024-09-25 | End: 2024-09-25

## 2024-09-25 RX ADMIN — AMLODIPINE BESYLATE 5 MG: 5 TABLET ORAL at 16:42

## 2024-09-25 RX ADMIN — VITAM B12 100 MCG: 100 TAB at 09:53

## 2024-09-25 RX ADMIN — ATORVASTATIN CALCIUM 80 MG: 80 TABLET, FILM COATED ORAL at 20:55

## 2024-09-25 RX ADMIN — INSULIN GLARGINE 11 UNITS: 100 INJECTION, SOLUTION SUBCUTANEOUS at 21:04

## 2024-09-25 RX ADMIN — MELATONIN TAB 3 MG 3 MG: 3 TAB at 21:01

## 2024-09-25 RX ADMIN — INSULIN LISPRO 2 UNITS: 100 INJECTION, SOLUTION INTRAVENOUS; SUBCUTANEOUS at 14:45

## 2024-09-25 RX ADMIN — ASPIRIN 81 MG: 81 TABLET, CHEWABLE ORAL at 09:52

## 2024-09-25 RX ADMIN — POLYETHYLENE GLYCOL 3350 17 G: 17 POWDER, FOR SOLUTION ORAL at 09:52

## 2024-09-25 RX ADMIN — AMLODIPINE BESYLATE 5 MG: 5 TABLET ORAL at 09:52

## 2024-09-25 NOTE — PLAN OF CARE
Problem: Discharge Planning  Goal: Discharge to home or other facility with appropriate resources  Outcome: Progressing     Problem: Chronic Conditions and Co-morbidities  Goal: Patient's chronic conditions and co-morbidity symptoms are monitored and maintained or improved  Outcome: Progressing     Problem: Skin/Tissue Integrity  Goal: Absence of new skin breakdown  Description: 1.  Monitor for areas of redness and/or skin breakdown  2.  Assess vascular access sites hourly  3.  Every 4-6 hours minimum:  Change oxygen saturation probe site  4.  Every 4-6 hours:  If on nasal continuous positive airway pressure, respiratory therapy assess nares and determine need for appliance change or resting period.  Outcome: Progressing     Problem: Safety - Adult  Goal: Free from fall injury  Outcome: Progressing     Problem: ABCDS Injury Assessment  Goal: Absence of physical injury  Outcome: Progressing     Problem: Confusion  Goal: Confusion, delirium, dementia, or psychosis is improved or at baseline  Description: INTERVENTIONS:  1. Assess for possible contributors to thought disturbance, including medications, impaired vision or hearing, underlying metabolic abnormalities, dehydration, psychiatric diagnoses, and notify attending LIP  2. [place] high risk fall precautions, as indicated  3. Provide frequent short contacts to provide reality reorientation, refocusing and direction  4. Decrease environmental stimuli, including noise as appropriate  5. Monitor and intervene to maintain adequate nutrition, hydration, elimination, sleep and activity  6. If unable to ensure safety without constant attention obtain sitter and review sitter guidelines with assigned personnel  7. Initiate Psychosocial CNS and Spiritual Care consult, as indicated  Outcome: Progressing     Problem: Nutrition Deficit:  Goal: Optimize nutritional status  Outcome: Progressing

## 2024-09-25 NOTE — PROGRESS NOTES
Patient noted to be somewhat restless after her  left for the evening. Up walking in her room and in the mancia. Difficult to redirect and somewhat resistive to care. Assisted to toilet, offered snacks and drinks. Patient stated she didn't know what she should be doing. 1:1 provided by this nurse for a short time.  initiated at 2140. Patient continued to be up and down several times. Nursing continues to attempt to meet needs. In bed at this time with eyes closed. with  at bedside. Bed in low position, call light and bedside table in reach.

## 2024-09-25 NOTE — PROGRESS NOTES
[facility] - Inpatient Rehabilitation Department   Phone: [phone]    Occupational Therapy    [] Initial Evaluation            [x] Daily Treatment Note         [] Discharge Summary      Patient: Melanie Tobin   : 1955   MRN: [id number]   Date of Service:  2024    Admitting Diagnosis:  Acute CVA (cerebrovascular accident) (HCC)  Current Admission Summary: Melanie Tobin is a 69 y.o.  who presented to the ED on  for c/o Hyperglycemia and AMS.  Patient has a PMHx: DM II and no PSHx on file.  Upon ED evaluation patient was a poor historian with altered mental status and unable to provide any information and history was provided by spouse.   stated she was having gradual cognitive changes for several months now.  He reported patient has been having difficulty with memory and having episodes of confusion.  However, the last 4 to 5 days her confusion has gotten worse to the point where she is not making sense of her speech.  Patient was taken to the emergency department at a different hospital by sister.  Was discharged with questionable urinary tract infection.  Patient followed up with primary care physician and was placed on antibiotics for urinary tract infection.  Patient has been sleeping more per .  She has also been having elevated blood pressure, poor appetite, yet increased blood glucose levels.  Upon ED evaluation she answered her name correctly. When asked other questions she stated, \"You sound like my mom and sister, all you have into sit on the roof if you want\".  She was easily distracted and tangential.   denied she had any fever, chills, cough, chest pain, or SOB.  Blood values were significant for BUN/Cr of 27/2.03; Glucose of 246; Ammonia level 23; UA had protein of 200 glucose of 100; Toxicology was negative.  CBC was normal and CT Head did not show any acute abnormality. MRI Brain was significant for acute punctate infarcts in left frontal lobe and

## 2024-09-25 NOTE — PROGRESS NOTES
impaired comprehension and expression. Pt demonstrates moderate impairments in basic comprehension tasks and severe impairments in more complex comprehension tasks. Pt with error patterns in prolonged responses which required simplification and repetition. Pt demonstrates moderate impairments in basic verbal expression tasks and severe impairments in more complex verbal expression tasks. Pt demonstrates accurate word production with a non-fluent pattern and impairments in word finding. Cognitive domains seemingly impacted by pt's language based deficits are attention, memory, and orientation. Dysphagia assessment also completed this date. Swallow mechanism appears functional, no further dysphagia indicated. Pt would benefit from participation in speech therapy services to improve comprehension and expression.    Body mass index is 39.24 kg/m².        Assessment/Plan:  Functional progress: Ambulating 10 feet contact-guard assist with no device  This patient continues to require an ARU level of care from all disciplines to address the following issues:    #. Left frontal and thalamic ischemic CVA  - Suspected etiology is small vessel disease vs cardioembolic.  - MRI w/ punctate acute cortical infarct in the parasagittal left frontal lobe and in the medial left thalamus.  - Continue aspirin 81 mg daily, atorvastatin 80 mg nightly  - Optimization of comorbid HTN and DM2  - 30 day event monitor (ordered per [facility], but did not arrive with patient)  - SLP for cog/language     #. Urinary incontinence  - Timed toileting    #. Encephalopathy  - Right temporal slowing noted on EEG, but no epileptiform activity  - LP deferred at outside hospital  - Continue vitamin B12 supplementation.  Thiamine levels pending.  - ANCA panel WNL, titers negative (obtained via [facility] Everywhere)  - Outpatient neuropsychological testing has been recommended  - Sleep-wake cycle regulation.   - May consider trial of low-dose stimulant to improve  attention/initiation.      #. DARA on suspected CKD  - Cr 1.7, stable from prior baseline at   - Continue to monitor     #.  HTN  - Increase amlodipine to 10 mg daily, hold for SBP < 110 mmHg  - Hydralazine 10 mg every 6 hours as needed for SBP >180 mmHg  - Avoid hypotension     #. DM2  - Lantus 11 units nightly  - SSI     CODE: Full Code  Diet: ADULT DIET; Regular; 4 carb choices (60 gm/meal)  Bowels: Per protocol  Bladder: Per protocol   Sleep: Melatonin 3 mg nightly, Trazodone 50 mg nightly PRN.   Pain: Tylenol 650 mg every 4 hours as needed  DVT PPx: Heparin 5000 units subcutaneous every 8 hours.  Discussed with patient's  that aspirin monotherapy is not sufficient DVT prophylaxis, and that SCDs are contraindicated due to concerns with fall risk.  Will continue to prescribe heparin until patient is ambulating sufficiently to reduce her risk of deep vein thrombosis.  ELOS: 14 days  Follow-ups: Neurology (1/10/25 w/ Dr. Howie Gage), PCP    Alton Torres MD 9/25/2024, 2:51 PM    * This document was created using dictation software.  While all precautions were taken to ensure accuracy, errors may have occurred.  Please disregard any typographical errors.

## 2024-09-25 NOTE — PROGRESS NOTES
medial thalamus. SLP evaluated patient on 9/19, and she did not demonstrate any s/s of aspiration, and she was placed on a regular ADA diet.  Additionally, SLP performed a Cognitive assessment and noted moderate fluent expressive aphasia characterized by anomia and reduced verbal output and moderate-severe receptive aphasia characterized by reduced auditory comprehension in the domains of command following and answering yes/no questions. Repetition skills and automatic speech appear intact. Patient able to answer open-ended questions fluently but content is typically incorrect. She appears highly distractible w/ poor attention and only oriented to self.  An Echocardiogram was performed with demonstrated normal LVEF, no evidence of interatrial shunt.  An EEG was performed and was significant for intermittent focal slow activity in the form of irregular delta activity over the right frontal-temporal head region.    Past Medical History:  has a past medical history of Cerebral artery occlusion with cerebral infarction (HCC), Diabetes mellitus (HCC), Hyperlipidemia, and Hypertension.  Past Surgical History:  has a past surgical history that includes eye surgery; fracture surgery; and joint replacement.  Discharge Recommendations: home with 24 hour supervision  DME Required For Discharge: no DME required at discharge  Precautions/Restrictions: medium fall risk  Weight Bearing Restrictions: no restrictions  [] Right Upper Extremity  [] Left Upper Extremity [] Right Lower Extremity  [] Left Lower Extremity     Required Braces/Orthotics: no braces required   [] Right  [] Left  Positional Restrictions:no positional restrictions    Pre-Admission Information   Lives With: spouse                  Type of Home: house  Home Layout: one level, laundry in basement (pt rarely goes down to the basement)  Home Access: pt states there are steps but does not know how many or if there are handrails  Bathroom Layout: walker accessible,  maintains balance at SBA/supervision without use of UE support  Static Standing Balance: fair: maintains balance at CGA without use of UE support  Dynamic Standing Balance: fair: maintains balance at CGA without use of UE support  Comments:    Other Therapeutic Interventions  1st session:  Pt very resistant to therapy this date.  Required PT and RN to complete mobility for change of clothing and bed.  Intermittent physical assist required due to resistance.  TotalA to change clothing due to refusal to participate.  Pt remained in recliner with alarm on and all needs in reach with  present.    2nd session: Patient sitting in recliner chair upon arrival. Patient initially resistant to working with PT, her sister arrived at beginning of session and increased motivation for patient to participate. Patient completes STS from recliner chair and toilet with RW placed in front and CGA. Patient ambulates 50 ft + 10 ft with use of RW and CGA. Patient with multiple standing rest breaks onto forearms of RW, max VC educating patient that it was unsafe to lean on RW on forearms - no carryover of cueing. Patient urinates on toilet, requires max A for pericare and patient completes lower body dressing SBA-CGA. Patient returns to recliner chair by end of session. Chair alarm on and call light within reach.     Functional Outcomes                 Cognition  Overall Cognitive Status: Impaired  Arousal/Alertness: inconsistent responses to stimuli  Following Commands: inconsistently follows commands  Attention Span: difficulty dividing attention, unable to maintain attention  Memory: decreased short term memory  Safety Judgement: decreased awareness of need for safety  Problem Solving: assistance required to identify errors made, assistance required to correct errors made  Insights: not aware of deficits  Initiation: requires cues for all  Sequencing: requires cues for some  Orientation:    oriented to person, disoriented to

## 2024-09-25 NOTE — PLAN OF CARE
Problem: Discharge Planning  Goal: Discharge to home or other facility with appropriate resources  Outcome: Progressing     Problem: Chronic Conditions and Co-morbidities  Goal: Patient's chronic conditions and co-morbidity symptoms are monitored and maintained or improved  Outcome: Progressing     Problem: Skin/Tissue Integrity  Goal: Absence of new skin breakdown  Description: 1.  Monitor for areas of redness and/or skin breakdown  2.  Assess vascular access sites hourly  3.  Every 4-6 hours minimum:  Change oxygen saturation probe site  4.  Every 4-6 hours:  If on nasal continuous positive airway pressure, respiratory therapy assess nares and determine need for appliance change or resting period.  Outcome: Progressing

## 2024-09-25 NOTE — DISCHARGE INSTR - COC
Continuity of Care Form    Patient Name: Melanie Tobin   :  1955  MRN:  [id number]    Admit date:  2024  Discharge date:  ***    Code Status Order: Full Code   Advance Directives:   Advance Care Flowsheet Documentation             Admitting Physician:  Liyah Meeks MD  PCP: Faustina Marshall MD    Discharging Nurse: ***  Discharging Hospital Unit/Room#: ARU-4911/4911-01  Discharging Unit Phone Number: ***    Emergency Contact:   Extended Emergency Contact Information  Primary Emergency Contact: Stephane Tobin  Address: [address] [place] [address]  Home Phone: [phone]  Relation: Spouse  Secondary Emergency Contact: Trinity Liao  Mobile Phone: [phone]  Relation: Child    Past Surgical History:  Past Surgical History:   Procedure Laterality Date    EYE SURGERY      FRACTURE SURGERY      JOINT REPLACEMENT         Immunization History:     There is no immunization history on file for this patient.    Active Problems:  Patient Active Problem List   Diagnosis Code    Chest pain R07.9    Headache R51.9    Diabetes mellitus type 2, uncontrolled [id number]    Acute CVA (cerebrovascular accident) ([facility]) I63.9       Isolation/Infection:   Isolation            No Isolation          Patient Infection Status       None to display            Nurse Assessment:  Last Vital Signs: BP (!) 165/78   Pulse 78   Temp 97.9 °F (36.6 °C) (Oral)   Resp 18   Ht 1.676 m (5' 5.98\")   Wt 110.2 kg (243 lb)   SpO2 94%   BMI 39.24 kg/m²     Last documented pain score (0-10 scale):    Last Weight:   Wt Readings from Last 1 Encounters:   24 110.2 kg (243 lb)     Mental Status:  {IP PT MENTAL STATUS:}    IV Access:  { HARPER IV ACCESS:329553827}    Nursing Mobility/ADLs:  Walking   {CHP DME ADLs:078989624}  Transfer  {CHP DME ADLs:552141660}  Bathing  {CHP DME ADLs:613540266}  Dressing  {CHP DME ADLs:629645742}  Toileting  {CHP DME ADLs:836924700}  Feeding   Stable    Rehab Potential (if transferring to Rehab): Fair    Recommended Labs or Other Treatments After Discharge:   #. Left frontal and thalamic ischemic CVA  - Suspected etiology is small vessel disease vs cardioembolic.  - MRI w/ punctate acute cortical infarct in the parasagittal left frontal lobe and in the medial left thalamus.  - Continue aspirin 81 mg daily, atorvastatin 80 mg nightly  - Optimization of comorbid HTN and DM2  - 30 day event monitor (ordered per [facility], but did not arrive with patient)  - SLP for cog/language     #. Acute uncomplicated cystitis  - UA w/ (+) nitrite, large LE  - UCx: E coli, sensitivities pending  - Continue empiric cefdinir 300 mg q12h x5 days, will contact patient if change in therapy warranted after sensitivities result     #. Encephalopathy  - Right temporal slowing noted on EEG, but no epileptiform activity  - LP deferred at outside hospital  - Continue vitamin B12 supplementation.  Thiamine levels pending.  - ANCA panel WNL, titers negative (obtained via Care Everywhere)  - Outpatient neuropsychological testing has been recommended  - Sleep-wake cycle regulation.   - Defer trial of stimulant, patient opposed.   - Suspect a component of depression contributing to current symptomatology, offered Spiritual Care consultation and/or initiation of antidepressant but patient not interested.      #. CKD 3b  - Cr 1.9, stable  - Continue to monitor     #.  HTN  - Increased amlodipine to 10 mg daily on 9/25, hold for SBP < 110 mmHg  - Lisinopril 5 mg daily started on 9/30, increased to 10 mg daily on 10/1  - Hydralazine 10 mg every 6 hours as needed for SBP >180 mmHg  - Avoid hypotension     #. DM2  - Lantus 20 units nightly  - Low-intensity sliding scale coverage  - Follow-up with PCP for insulin adjustment, and consideration of additional oral medication     #. Nail dystrophy  - Podiatry consulted     CODE: Full Code  Diet: ADULT DIET; Regular; 4 carb choices (60 gm/meal)  Bowels:

## 2024-09-25 NOTE — PROGRESS NOTES
[facility] - Inpatient Rehabilitation Department   Phone: [phone]    Speech Therapy    [] Initial Evaluation            [x] Daily Treatment Note         [] Discharge Summary      Patient: Melanie Tobin   : 1955   MRN: [id number]   Date of Service:  2024  Admitting Diagnosis: Acute CVA (cerebrovascular accident) (HCC)  Current Admission Summary: Melanie Tobin is a 69 y.o.  who presented to the ED on  for c/o Hyperglycemia and AMS.  Patient has a PMHx: DM II and no PSHx on file.  Upon ED evaluation patient was a poor historian with altered mental status and unable to provide any information and history was provided by spouse.   stated she was having gradual cognitive changes for several months now.  He reported patient has been having difficulty with memory and having episodes of confusion.  However, the last 4 to 5 days her confusion has gotten worse to the point where she is not making sense of her speech.  Patient was taken to the emergency department at a different hospital by sister.  Was discharged with questionable urinary tract infection.  Patient followed up with primary care physician and was placed on antibiotics for urinary tract infection.  Patient has been sleeping more per .  She has also been having elevated blood pressure, poor appetite, yet increased blood glucose levels.  Upon ED evaluation she answered her name correctly. When asked other questions she stated, \"You sound like my mom and sister, all you have into sit on the roof if you want\".  She was easily distracted and tangential.   denied she had any fever, chills, cough, chest pain, or SOB.  Blood values were significant for BUN/Cr of 27/2.03; Glucose of 246; Ammonia level 23; UA had protein of 200 glucose of 100; Toxicology was negative.  CBC was normal and CT Head did not show any acute abnormality. MRI Brain was significant for acute punctate infarcts in left frontal lobe and left

## 2024-09-26 LAB
GLUCOSE BLD-MCNC: 166 MG/DL (ref 70–99)
GLUCOSE BLD-MCNC: 190 MG/DL (ref 70–99)
GLUCOSE BLD-MCNC: 196 MG/DL (ref 70–99)
GLUCOSE BLD-MCNC: 219 MG/DL (ref 70–99)
PERFORMED ON: ABNORMAL

## 2024-09-26 PROCEDURE — 6370000000 HC RX 637 (ALT 250 FOR IP): Performed by: STUDENT IN AN ORGANIZED HEALTH CARE EDUCATION/TRAINING PROGRAM

## 2024-09-26 PROCEDURE — 97535 SELF CARE MNGMENT TRAINING: CPT

## 2024-09-26 PROCEDURE — 6360000002 HC RX W HCPCS: Performed by: STUDENT IN AN ORGANIZED HEALTH CARE EDUCATION/TRAINING PROGRAM

## 2024-09-26 PROCEDURE — 1280000000 HC REHAB R&B

## 2024-09-26 PROCEDURE — 97530 THERAPEUTIC ACTIVITIES: CPT

## 2024-09-26 PROCEDURE — 97116 GAIT TRAINING THERAPY: CPT

## 2024-09-26 PROCEDURE — 92507 TX SP LANG VOICE COMM INDIV: CPT

## 2024-09-26 RX ORDER — MONTELUKAST SODIUM 10 MG/1
10 TABLET ORAL NIGHTLY
Status: DISPENSED | OUTPATIENT
Start: 2024-09-26

## 2024-09-26 RX ORDER — INSULIN GLARGINE 100 [IU]/ML
12 INJECTION, SOLUTION SUBCUTANEOUS NIGHTLY
Status: DISCONTINUED | OUTPATIENT
Start: 2024-09-26 | End: 2024-09-27

## 2024-09-26 RX ADMIN — HEPARIN SODIUM 5000 UNITS: 5000 INJECTION INTRAVENOUS; SUBCUTANEOUS at 06:08

## 2024-09-26 RX ADMIN — ATORVASTATIN CALCIUM 80 MG: 80 TABLET, FILM COATED ORAL at 20:49

## 2024-09-26 RX ADMIN — INSULIN GLARGINE 12 UNITS: 100 INJECTION, SOLUTION SUBCUTANEOUS at 20:58

## 2024-09-26 RX ADMIN — INSULIN LISPRO 1 UNITS: 100 INJECTION, SOLUTION INTRAVENOUS; SUBCUTANEOUS at 16:53

## 2024-09-26 RX ADMIN — ASPIRIN 81 MG: 81 TABLET, CHEWABLE ORAL at 09:25

## 2024-09-26 RX ADMIN — HEPARIN SODIUM 5000 UNITS: 5000 INJECTION INTRAVENOUS; SUBCUTANEOUS at 20:49

## 2024-09-26 RX ADMIN — POLYETHYLENE GLYCOL 3350 17 G: 17 POWDER, FOR SOLUTION ORAL at 09:25

## 2024-09-26 RX ADMIN — MONTELUKAST SODIUM 10 MG: 10 TABLET, FILM COATED ORAL at 20:49

## 2024-09-26 RX ADMIN — MELATONIN TAB 3 MG 3 MG: 3 TAB at 20:49

## 2024-09-26 RX ADMIN — AMLODIPINE BESYLATE 10 MG: 5 TABLET ORAL at 09:24

## 2024-09-26 RX ADMIN — VITAM B12 100 MCG: 100 TAB at 09:24

## 2024-09-26 NOTE — CARE COORDINATION
Received a call back from Malena at [facility] stating yes they are able to accept for [facility] services.    Electronically signed by NIYA Hodge LSW on 9/26/2024 at 2:43 PM

## 2024-09-26 NOTE — PLAN OF CARE
Problem: Discharge Planning  Goal: Discharge to home or other facility with appropriate resources  Outcome: Progressing     Problem: Chronic Conditions and Co-morbidities  Goal: Patient's chronic conditions and co-morbidity symptoms are monitored and maintained or improved  Outcome: Progressing     Problem: Skin/Tissue Integrity  Goal: Absence of new skin breakdown  Description: 1.  Monitor for areas of redness and/or skin breakdown  2.  Assess vascular access sites hourly  3.  Every 4-6 hours minimum:  Change oxygen saturation probe site  4.  Every 4-6 hours:  If on nasal continuous positive airway pressure, respiratory therapy assess nares and determine need for appliance change or resting period.  Outcome: Progressing     Problem: Safety - Adult  Goal: Free from fall injury  Outcome: Progressing     Problem: ABCDS Injury Assessment  Goal: Absence of physical injury  Outcome: Progressing     Problem: Confusion  Goal: Confusion, delirium, dementia, or psychosis is improved or at baseline  Description: INTERVENTIONS:  1. Assess for possible contributors to thought disturbance, including medications, impaired vision or hearing, underlying metabolic abnormalities, dehydration, psychiatric diagnoses, and notify attending LIP  2. [place] high risk fall precautions, as indicated  3. Provide frequent short contacts to provide reality reorientation, refocusing and direction  4. Decrease environmental stimuli, including noise as appropriate  5. Monitor and intervene to maintain adequate nutrition, hydration, elimination, sleep and activity  6. If unable to ensure safety without constant attention obtain sitter and review sitter guidelines with assigned personnel  7. Initiate Psychosocial CNS and Spiritual Care consult, as indicated  Outcome: Progressing     Problem: Nutrition Deficit:  Goal: Optimize nutritional status  Outcome: Progressing     Problem: Risk for Elopement  Goal: Patient will not exit the unit/facility  without proper excort  Outcome: Progressing

## 2024-09-26 NOTE — PLAN OF CARE
Problem: Discharge Planning  Goal: Discharge to home or other facility with appropriate resources  9/25/2024 2215 by Rody Franco RN  Outcome: Progressing  9/25/2024 1746 by Anna Rosario RN  Outcome: Progressing     Problem: Chronic Conditions and Co-morbidities  Goal: Patient's chronic conditions and co-morbidity symptoms are monitored and maintained or improved  9/25/2024 2215 by Rody Franco RN  Outcome: Progressing  9/25/2024 1746 by Anna Rosario RN  Outcome: Progressing     Problem: Skin/Tissue Integrity  Goal: Absence of new skin breakdown  Description: 1.  Monitor for areas of redness and/or skin breakdown  2.  Assess vascular access sites hourly  3.  Every 4-6 hours minimum:  Change oxygen saturation probe site  4.  Every 4-6 hours:  If on nasal continuous positive airway pressure, respiratory therapy assess nares and determine need for appliance change or resting period.  9/25/2024 2215 by Rody Franco RN  Outcome: Progressing  9/25/2024 1746 by Anna Rosario RN  Outcome: Progressing     Problem: Safety - Adult  Goal: Free from fall injury  Outcome: Progressing     Problem: ABCDS Injury Assessment  Goal: Absence of physical injury  Outcome: Progressing     Problem: Confusion  Goal: Confusion, delirium, dementia, or psychosis is improved or at baseline  Description: INTERVENTIONS:  1. Assess for possible contributors to thought disturbance, including medications, impaired vision or hearing, underlying metabolic abnormalities, dehydration, psychiatric diagnoses, and notify attending LIP  2. [place] high risk fall precautions, as indicated  3. Provide frequent short contacts to provide reality reorientation, refocusing and direction  4. Decrease environmental stimuli, including noise as appropriate  5. Monitor and intervene to maintain adequate nutrition, hydration, elimination, sleep and activity  6. If unable to ensure safety without constant attention obtain sitter and

## 2024-09-26 NOTE — PROGRESS NOTES
[facility] - Inpatient Rehabilitation Department   Phone: [phone]    Occupational Therapy    [] Initial Evaluation            [x] Daily Treatment Note         [] Discharge Summary      Patient: Melanie Tobin   : 1955   MRN: [id number]   Date of Service:  2024    Admitting Diagnosis:  Acute CVA (cerebrovascular accident) (HCC)  Current Admission Summary: Melanie Tobin is a 69 y.o.  who presented to the ED on  for c/o Hyperglycemia and AMS.  Patient has a PMHx: DM II and no PSHx on file.  Upon ED evaluation patient was a poor historian with altered mental status and unable to provide any information and history was provided by spouse.   stated she was having gradual cognitive changes for several months now.  He reported patient has been having difficulty with memory and having episodes of confusion.  However, the last 4 to 5 days her confusion has gotten worse to the point where she is not making sense of her speech.  Patient was taken to the emergency department at a different hospital by sister.  Was discharged with questionable urinary tract infection.  Patient followed up with primary care physician and was placed on antibiotics for urinary tract infection.  Patient has been sleeping more per .  She has also been having elevated blood pressure, poor appetite, yet increased blood glucose levels.  Upon ED evaluation she answered her name correctly. When asked other questions she stated, \"You sound like my mom and sister, all you have into sit on the roof if you want\".  She was easily distracted and tangential.   denied she had any fever, chills, cough, chest pain, or SOB.  Blood values were significant for BUN/Cr of 27/2.03; Glucose of 246; Ammonia level 23; UA had protein of 200 glucose of 100; Toxicology was negative.  CBC was normal and CT Head did not show any acute abnormality. MRI Brain was significant for acute punctate infarcts in left frontal lobe and

## 2024-09-26 NOTE — PROGRESS NOTES
[facility] - Inpatient Rehabilitation Department   Phone: [phone]    Speech Therapy    [] Initial Evaluation            [x] Daily Treatment Note         [] Discharge Summary      Patient: Melanie Tobin   : 1955   MRN: [id number]   Date of Service:  2024  Admitting Diagnosis: Acute CVA (cerebrovascular accident) (HCC)  Current Admission Summary: Melanie Tobin is a 69 y.o.  who presented to the ED on  for c/o Hyperglycemia and AMS.  Patient has a PMHx: DM II and no PSHx on file.  Upon ED evaluation patient was a poor historian with altered mental status and unable to provide any information and history was provided by spouse.   stated she was having gradual cognitive changes for several months now.  He reported patient has been having difficulty with memory and having episodes of confusion.  However, the last 4 to 5 days her confusion has gotten worse to the point where she is not making sense of her speech.  Patient was taken to the emergency department at a different hospital by sister.  Was discharged with questionable urinary tract infection.  Patient followed up with primary care physician and was placed on antibiotics for urinary tract infection.  Patient has been sleeping more per .  She has also been having elevated blood pressure, poor appetite, yet increased blood glucose levels.  Upon ED evaluation she answered her name correctly. When asked other questions she stated, \"You sound like my mom and sister, all you have into sit on the roof if you want\".  She was easily distracted and tangential.   denied she had any fever, chills, cough, chest pain, or SOB.  Blood values were significant for BUN/Cr of 27/2.03; Glucose of 246; Ammonia level 23; UA had protein of 200 glucose of 100; Toxicology was negative.  CBC was normal and CT Head did not show any acute abnormality. MRI Brain was significant for acute punctate infarcts in left frontal lobe and left

## 2024-09-26 NOTE — PROGRESS NOTES
[facility] - Inpatient Rehabilitation Department   Phone: [phone]    Physical Therapy    [] Initial Evaluation            [x] Daily Treatment Note         [] Discharge Summary      Patient: Melanie Tobin   : 1955   MRN: [id number]   Date of Service:  2024  Admitting Diagnosis: Acute CVA (cerebrovascular accident) (HCC)  Current Admission Summary: Melanie Tobin is a 69 y.o.  who presented to the ED on  for c/o Hyperglycemia and AMS.  Patient has a PMHx: DM II and no PSHx on file.  Upon ED evaluation patient was a poor historian with altered mental status and unable to provide any information and history was provided by spouse.   stated she was having gradual cognitive changes for several months now.  He reported patient has been having difficulty with memory and having episodes of confusion.  However, the last 4 to 5 days her confusion has gotten worse to the point where she is not making sense of her speech.  Patient was taken to the emergency department at a different hospital by sister.  Was discharged with questionable urinary tract infection.  Patient followed up with primary care physician and was placed on antibiotics for urinary tract infection.  Patient has been sleeping more per .  She has also been having elevated blood pressure, poor appetite, yet increased blood glucose levels.  Upon ED evaluation she answered her name correctly. When asked other questions she stated, \"You sound like my mom and sister, all you have into sit on the roof if you want\".  She was easily distracted and tangential.   denied she had any fever, chills, cough, chest pain, or SOB.  Blood values were significant for BUN/Cr of 27/2.03; Glucose of 246; Ammonia level 23; UA had protein of 200 glucose of 100; Toxicology was negative.  CBC was normal and CT Head did not show any acute abnormality. MRI Brain was significant for acute punctate infarcts in left frontal lobe and left  1030         Time Out  1100         Minutes  30           Timed Code Treatment Minutes:  30 + 30 Minute    Total Treatment Minutes:  60 Minutes      Electronically Signed By: Christoph Brown, DPT [id number]

## 2024-09-26 NOTE — PROGRESS NOTES
Melanie Tobin  9/26/2024  [id number]    Chief Complaint: Acute CVA (cerebrovascular accident) (HCC)    Subjective    No acute events overnight.     Patient reports that she is doing well today. Denies any headaches, chest pain, dyspnea, abdominal pain. Appetite doing a little better today.     Last BM:          Objective    Patient Vitals for the past 24 hrs:   BP Temp Temp src Pulse Resp SpO2 Weight   09/26/24 1400 -- -- -- -- -- -- 110.5 kg (243 lb 9.6 oz)   09/26/24 0931 (!) 151/82 98 °F (36.7 °C) -- 80 18 98 % --   09/26/24 0606 (!) 155/80 -- -- 75 -- 95 % --   09/25/24 1919 (!) 189/69 98.9 °F (37.2 °C) Oral 96 18 95 % --     Gen: No distress, pleasant.   HEENT: Normocephalic, atraumatic.   CV: Regular rate and rhythm. Extremities warm, well perfused.   Resp: No respiratory distress. CTAB.  Abd: Soft, nontender.  Ext: No edema.   Neuro: Alert, appropriate responses to simple yes/no questions.    Laboratory data: Available via EMR.     Therapy progress:       PT    Supine to Sit: Supervision or touching assistance  Sit to Supine: Supervision or touching assistance   Sit to Stand: Supervision or touching assistance  Chair/Bed to Chair Transfer: Supervision or touching assistance  Car Transfer: Supervision or touching assistance  Ambulation 10 ft: Supervision or touching assistance  Ambulation 50 ft: Supervision or touching assistance  Ambulation 150 ft:    Stairs - 1 Step: Supervision or touching assistance  Stairs - 4 Step: Supervision or touching assistance  Stairs - 12 Step:      OT    Eating:    Oral Hygiene: Supervision or touching assistance  Bathing: Supervision or touching assistance  Upper Body Dressing: Supervision or touching assistance  Lower Body Dressing: Partial/moderate assistance  Toilet Transfer: Supervision or touching assistance  Toilet Hygiene: Supervision or touching assistance    Speech Therapy    Pt presents with moderate to severe Global Aphasia characterized by impaired comprehension and  expression. Pt demonstrates moderate impairments in basic comprehension tasks and severe impairments in more complex comprehension tasks. Pt with error patterns in prolonged responses which required simplification and repetition. Pt demonstrates moderate impairments in basic verbal expression tasks and severe impairments in more complex verbal expression tasks. Pt demonstrates accurate word production with a non-fluent pattern and impairments in word finding. Cognitive domains seemingly impacted by pt's language based deficits are attention, memory, and orientation. Pt with slight improvements this date within contextual language comprehension and written expression. Pt would benefit from ongoing participation in speech therapy services to improve comprehension and expression.    Body mass index is 39.34 kg/m².        Assessment/Plan:  Functional progress: Ambulating 10 feet contact-guard assist with no device  This patient continues to require an ARU level of care from all disciplines to address the following issues:    #. Left frontal and thalamic ischemic CVA  - Suspected etiology is small vessel disease vs cardioembolic.  - MRI w/ punctate acute cortical infarct in the parasagittal left frontal lobe and in the medial left thalamus.  - Continue aspirin 81 mg daily, atorvastatin 80 mg nightly  - Optimization of comorbid HTN and DM2  - 30 day event monitor (ordered per [facility], but did not arrive with patient)  - SLP for cog/language     #. Urinary incontinence  - Timed toileting    #. Encephalopathy  - Right temporal slowing noted on EEG, but no epileptiform activity  - LP deferred at outside hospital  - Continue vitamin B12 supplementation.  Thiamine levels pending.  - ANCA panel WNL, titers negative (obtained via Care Everywhere)  - Outpatient neuropsychological testing has been recommended  - Sleep-wake cycle regulation.   - May consider trial of low-dose stimulant to improve attention/initiation.      #. DARA on

## 2024-09-26 NOTE — CARE COORDINATION
Case Management Assessment  Initial Evaluation    Date/Time of Evaluation: 9/26/2024 2:42 PM  Assessment Completed by: NIYA Hodge, KELYW    If patient is discharged prior to next notation, then this note serves as note for discharge by case management.    Patient Name: Melanie Tobin                   YOB: 1955  Diagnosis: CVA (cerebral vascular accident) (HCC) [I63.9]                   Date / Time: 9/23/2024  3:21 PM    Patient Admission Status: REHAB IP   Readmission Risk (Low < 19, Mod (19-27), High > 27): Readmission Risk Score: 13.3    Current PCP: Faustina Marshall MD  PCP verified by CM? Yes    Chart Reviewed: Yes      History Provided by: Child/Family  Patient Orientation: Other (see comment) (A&Ox1 only)    Patient Cognition: Alert    Hospitalization in the last 30 days (Readmission):  No    If yes, Readmission Assessment in CM Navigator will be completed.    Advance Directives:      Code Status: Full Code   Patient's Primary Decision Maker is:        Discharge Planning:    Patient lives with: Spouse/Significant Other Type of Home: House (1 level - 1 step)  Primary Care Giver: Self (Family will need to care at home)  Patient Support Systems include: Spouse/Significant Other, Family Members   Current Financial resources: Medicare  Current community resources: None  Current services prior to admission: Durable Medical Equipment            Current DME: Other (Comment) (walker, hurrycane, w/c, shower chair)            Type of Home Care services:  OT, PT, Nursing Services (and speech)    ADLS  Prior functional level: Independent in ADLs/IADLs  Current functional level: Independent in ADLs/IADLs (mostly cognitive issues)    PT AM-PAC:   /24  OT AM-PAC:   /24    Family can provide assistance at DC: Yes  Would you like Case Management to discuss the discharge plan with any other family members/significant others, and if so, who? Yes (w/spouse)  Plans to Return to Present Housing: Yes  Other  LILLI NÚÑEZ  Case Management Department    Electronically signed by NIYA Hodge LSW on 9/26/2024 at 2:43 PM

## 2024-09-26 NOTE — PROGRESS NOTES
Pt. Assessment complete. Pt. Lying in bed at this time without distress, Resp. Even/nonlabored. Sitter remains at bedside. Bed exit alarm on. All needs met at this time.

## 2024-09-27 LAB
ANION GAP SERPL CALCULATED.3IONS-SCNC: 12 MMOL/L (ref 3–16)
BASOPHILS # BLD: 0.1 K/UL (ref 0–0.2)
BASOPHILS NFR BLD: 0.8 %
BUN SERPL-MCNC: 25 MG/DL (ref 7–20)
CALCIUM SERPL-MCNC: 9.2 MG/DL (ref 8.3–10.6)
CHLORIDE SERPL-SCNC: 105 MMOL/L (ref 99–110)
CO2 SERPL-SCNC: 24 MMOL/L (ref 21–32)
CREAT SERPL-MCNC: 1.8 MG/DL (ref 0.6–1.2)
DEPRECATED RDW RBC AUTO: 14 % (ref 12.4–15.4)
EOSINOPHIL # BLD: 0.1 K/UL (ref 0–0.6)
EOSINOPHIL NFR BLD: 1.6 %
GFR SERPLBLD CREATININE-BSD FMLA CKD-EPI: 30 ML/MIN/{1.73_M2}
GLUCOSE BLD-MCNC: 146 MG/DL (ref 70–99)
GLUCOSE BLD-MCNC: 160 MG/DL (ref 70–99)
GLUCOSE BLD-MCNC: 162 MG/DL (ref 70–99)
GLUCOSE BLD-MCNC: 276 MG/DL (ref 70–99)
GLUCOSE SERPL-MCNC: 161 MG/DL (ref 70–99)
HCT VFR BLD AUTO: 34.3 % (ref 36–48)
HGB BLD-MCNC: 11.3 G/DL (ref 12–16)
LYMPHOCYTES # BLD: 2.2 K/UL (ref 1–5.1)
LYMPHOCYTES NFR BLD: 25.8 %
MCH RBC QN AUTO: 29.1 PG (ref 26–34)
MCHC RBC AUTO-ENTMCNC: 32.9 G/DL (ref 31–36)
MCV RBC AUTO: 88.5 FL (ref 80–100)
MONOCYTES # BLD: 0.5 K/UL (ref 0–1.3)
MONOCYTES NFR BLD: 6.3 %
NEUTROPHILS # BLD: 5.5 K/UL (ref 1.7–7.7)
NEUTROPHILS NFR BLD: 65.5 %
PERFORMED ON: ABNORMAL
PLATELET # BLD AUTO: 220 K/UL (ref 135–450)
PMV BLD AUTO: 12 FL (ref 5–10.5)
POTASSIUM SERPL-SCNC: 4.2 MMOL/L (ref 3.5–5.1)
RBC # BLD AUTO: 3.87 M/UL (ref 4–5.2)
SODIUM SERPL-SCNC: 141 MMOL/L (ref 136–145)
WBC # BLD AUTO: 8.4 K/UL (ref 4–11)

## 2024-09-27 PROCEDURE — 97535 SELF CARE MNGMENT TRAINING: CPT

## 2024-09-27 PROCEDURE — 85025 COMPLETE CBC W/AUTO DIFF WBC: CPT

## 2024-09-27 PROCEDURE — 97110 THERAPEUTIC EXERCISES: CPT

## 2024-09-27 PROCEDURE — 1280000000 HC REHAB R&B

## 2024-09-27 PROCEDURE — 92507 TX SP LANG VOICE COMM INDIV: CPT

## 2024-09-27 PROCEDURE — 6360000002 HC RX W HCPCS: Performed by: STUDENT IN AN ORGANIZED HEALTH CARE EDUCATION/TRAINING PROGRAM

## 2024-09-27 PROCEDURE — 6370000000 HC RX 637 (ALT 250 FOR IP): Performed by: STUDENT IN AN ORGANIZED HEALTH CARE EDUCATION/TRAINING PROGRAM

## 2024-09-27 PROCEDURE — 97116 GAIT TRAINING THERAPY: CPT

## 2024-09-27 PROCEDURE — 80048 BASIC METABOLIC PNL TOTAL CA: CPT

## 2024-09-27 PROCEDURE — 97530 THERAPEUTIC ACTIVITIES: CPT

## 2024-09-27 RX ORDER — INSULIN GLARGINE 100 [IU]/ML
14 INJECTION, SOLUTION SUBCUTANEOUS NIGHTLY
Status: DISPENSED | OUTPATIENT
Start: 2024-09-27

## 2024-09-27 RX ADMIN — INSULIN LISPRO 2 UNITS: 100 INJECTION, SOLUTION INTRAVENOUS; SUBCUTANEOUS at 12:37

## 2024-09-27 RX ADMIN — ASPIRIN 81 MG: 81 TABLET, CHEWABLE ORAL at 08:31

## 2024-09-27 RX ADMIN — ATORVASTATIN CALCIUM 80 MG: 80 TABLET, FILM COATED ORAL at 23:50

## 2024-09-27 RX ADMIN — POLYETHYLENE GLYCOL 3350 17 G: 17 POWDER, FOR SOLUTION ORAL at 08:31

## 2024-09-27 RX ADMIN — MONTELUKAST SODIUM 10 MG: 10 TABLET, FILM COATED ORAL at 23:50

## 2024-09-27 RX ADMIN — MELATONIN TAB 3 MG 3 MG: 3 TAB at 23:50

## 2024-09-27 RX ADMIN — INSULIN GLARGINE 14 UNITS: 100 INJECTION, SOLUTION SUBCUTANEOUS at 23:57

## 2024-09-27 RX ADMIN — VITAM B12 100 MCG: 100 TAB at 08:31

## 2024-09-27 RX ADMIN — HEPARIN SODIUM 5000 UNITS: 5000 INJECTION INTRAVENOUS; SUBCUTANEOUS at 05:30

## 2024-09-27 RX ADMIN — AMLODIPINE BESYLATE 10 MG: 5 TABLET ORAL at 08:31

## 2024-09-27 RX ADMIN — POLYETHYLENE GLYCOL 3350 17 G: 17 POWDER, FOR SOLUTION ORAL at 23:51

## 2024-09-27 NOTE — PROGRESS NOTES
Pt. Alert, flat affect, follows commands with delayed responses.  at bedside. Fall precautions in place. Bed exit alarm on. All needs met at this time.

## 2024-09-27 NOTE — PROGRESS NOTES
Melanie Tobin  9/27/2024  [id number]    Chief Complaint: Acute CVA (cerebrovascular accident) (HCC)    Subjective    No acute events overnight.     Patient reports that she is doing well today. Denies any fevers, chills, chest pain, dyspnea.     Last BM:          Objective    Patient Vitals for the past 24 hrs:   BP Temp Temp src Pulse Resp SpO2   09/27/24 0845 (!) 158/78 98.4 °F (36.9 °C) Oral 82 18 93 %   09/26/24 2045 (!) 169/81 97.9 °F (36.6 °C) Oral 86 18 95 %     Gen: No distress, pleasant.   HEENT: Normocephalic, atraumatic.   CV: Regular rate and rhythm. Extremities warm, well perfused.   Resp: No respiratory distress. CTAB.  Abd: Soft, nontender.  Ext: No edema.   Neuro: Alert, appropriate responses to simple yes/no questions.    Laboratory data: Available via EMR.     Therapy progress:       PT    Supine to Sit: Supervision or touching assistance  Sit to Supine: Supervision or touching assistance   Sit to Stand: Supervision or touching assistance  Chair/Bed to Chair Transfer: Supervision or touching assistance  Car Transfer: Supervision or touching assistance  Ambulation 10 ft: Supervision or touching assistance  Ambulation 50 ft: Supervision or touching assistance  Ambulation 150 ft:    Stairs - 1 Step: Supervision or touching assistance  Stairs - 4 Step: Supervision or touching assistance  Stairs - 12 Step:      OT    Eating:    Oral Hygiene: Supervision or touching assistance  Bathing: Supervision or touching assistance  Upper Body Dressing: Supervision or touching assistance  Lower Body Dressing: Partial/moderate assistance  Toilet Transfer: Supervision or touching assistance  Toilet Hygiene: Supervision or touching assistance    Speech Therapy    Pt presents with 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 Pt demonstrates moderate impairments in basic comprehension tasks and severe impairments in more complex comprehension tasks. Pt with error patterns in

## 2024-09-27 NOTE — PROGRESS NOTES
[facility] - Inpatient Rehabilitation Department   Phone: [phone]    Speech Therapy    [] Initial Evaluation            [x] Daily Treatment Note         [] Discharge Summary      Patient: Melanie Tobin   : 1955   MRN: [id number]   Date of Service:  2024  Admitting Diagnosis: Acute CVA (cerebrovascular accident) (HCC)  Current Admission Summary: Melanie Tobin is a 69 y.o.  who presented to the ED on  for c/o Hyperglycemia and AMS.  Patient has a PMHx: DM II and no PSHx on file.  Upon ED evaluation patient was a poor historian with altered mental status and unable to provide any information and history was provided by spouse.   stated she was having gradual cognitive changes for several months now.  He reported patient has been having difficulty with memory and having episodes of confusion.  However, the last 4 to 5 days her confusion has gotten worse to the point where she is not making sense of her speech.  Patient was taken to the emergency department at a different hospital by sister.  Was discharged with questionable urinary tract infection.  Patient followed up with primary care physician and was placed on antibiotics for urinary tract infection.  Patient has been sleeping more per .  She has also been having elevated blood pressure, poor appetite, yet increased blood glucose levels.  Upon ED evaluation she answered her name correctly. When asked other questions she stated, \"You sound like my mom and sister, all you have into sit on the roof if you want\".  She was easily distracted and tangential.   denied she had any fever, chills, cough, chest pain, or SOB.  Blood values were significant for BUN/Cr of 27/2.03; Glucose of 246; Ammonia level 23; UA had protein of 200 glucose of 100; Toxicology was negative.  CBC was normal and CT Head did not show any acute abnormality. MRI Brain was significant for acute punctate infarcts in left frontal lobe and left  Assessment: WFL      Subjective  General: Pt was alert and upright in chair for both sessions.  Pain: Did not state ; No observed signs of pain or discomfort   Safety Interventions: patient left in chair, chair alarm in place, and call light within reach      Therapeutic Interventions:     Session 1:      Comprehension: Severity: Moderate  and Severe   Following Directions: word identification  - pt was given written directions to follow, task was modified to following verbal directions  - 50% accuracy (4/8) accuracy   - pt left in room with task to continue working on upon end of session     Expressive Language: Severity: Moderate  and Severe   Confrontational naming: naming items on tray table   - 100% accuracy (11/11) independently   - pt was able to sustain attention to short task     Answering yes/no questions  - 62% accuracy (8/13), pt benefited from mod cues   - pt benefited from simplification of task  - pt struggled to sustain attention to task     Cognition: Severity: Moderate   Attention   - pt struggled to maintain adequate sustained attention to tasks  - pt struggled with dividing her attention between breakfast meal and tasks  - pt benefited from mod-max cues to redirect    Additional Interventions:    Session 2:     Comprehension: Severity: Moderate  and Severe   Single word comprehension   - pt matched single word to picture f/6  - 83% accuracy (10/12) independently, benefited from mod cues after pt lost attention  - errors due to inadequate sustained attention to task    Picture comprehension   - pt identified picture f/6 to match verbal description (basic animals and household items)   - 67% accuracy (8/12), benefited from max cues  - errors due to inadequate sustained attention to task     Expressive Language: Severity: Moderate  and Severe   Responsive naming: WH questions   - 55% accuracy (6/11), benefited from max cues  - pt required simplification of task (choosing from f/2)  - errors due to

## 2024-09-27 NOTE — PROGRESS NOTES
[facility] - Inpatient Rehabilitation Department   Phone: [phone]    Occupational Therapy    [] Initial Evaluation            [x] Daily Treatment Note         [] Discharge Summary      Patient: Melanie Tobin   : 1955   MRN: [id number]   Date of Service:  2024    Admitting Diagnosis:  Acute CVA (cerebrovascular accident) (HCC)  Current Admission Summary: Melanie Tobin is a 69 y.o.  who presented to the ED on  for c/o Hyperglycemia and AMS.  Patient has a PMHx: DM II and no PSHx on file.  Upon ED evaluation patient was a poor historian with altered mental status and unable to provide any information and history was provided by spouse.   stated she was having gradual cognitive changes for several months now.  He reported patient has been having difficulty with memory and having episodes of confusion.  However, the last 4 to 5 days her confusion has gotten worse to the point where she is not making sense of her speech.  Patient was taken to the emergency department at a different hospital by sister.  Was discharged with questionable urinary tract infection.  Patient followed up with primary care physician and was placed on antibiotics for urinary tract infection.  Patient has been sleeping more per .  She has also been having elevated blood pressure, poor appetite, yet increased blood glucose levels.  Upon ED evaluation she answered her name correctly. When asked other questions she stated, \"You sound like my mom and sister, all you have into sit on the roof if you want\".  She was easily distracted and tangential.   denied she had any fever, chills, cough, chest pain, or SOB.  Blood values were significant for BUN/Cr of 27/2.03; Glucose of 246; Ammonia level 23; UA had protein of 200 glucose of 100; Toxicology was negative.  CBC was normal and CT Head did not show any acute abnormality. MRI Brain was significant for acute punctate infarcts in left frontal lobe and  precautions, decreased recall of recent events, decreased short term memory, decreased long term memory  Safety Judgement: decreased awareness of need for assistance, decreased awareness of need for safety  Problem Solving: assistance required to generate solutions, assistance required to implement solutions, decreased awareness of errors, assistance required to identify errors made, assistance required to correct errors made  Insights: not aware of deficits  Initiation: requires cues for all  Sequencing: requires cues for all  Orientation:    oriented to person, disoriented to place, disoriented to time , and disoriented to situation - unable to state  but able to state first/last name and age  Command Following:   impaired     Education  Barriers To Learning: cognition and language  Patient Education: patient educated on goals, OT role and benefits, plan of care, ADL adaptive strategies, IADL safety, proper use of assistive device/equipment, adaptive device training, energy conservation, orientation, family education, transfer training, discharge recommendations  Learning Assessment:  patient will require reinforcement due to cognitive deficits    Assessment  Activity Tolerance: fair  Impairments Requiring Therapeutic Intervention: decreased functional mobility, decreased ADL status, decreased strength, decreased safety awareness, decreased cognition, decreased endurance, decreased balance, decreased IADL, decreased coordination, decreased posture  Prognosis: fair  Clinical Assessment: Pt continues to be significantly limited by language deficits and decreased intrinsic motivation. Pt is significantly limited by cognition, aphasia, and decreased safety awareness/insight. Requires SBA for transfers and ADLs this date. Skilled OT services warranted to maximize IND and safety in all occupational pursuits. Con't per POC. Recommending 24/7 SUP and assistance upon d/c home from hospital.   Safety Interventions:

## 2024-09-27 NOTE — PROGRESS NOTES
[facility] - Inpatient Rehabilitation Department   Phone: [phone]    Physical Therapy    [] Initial Evaluation            [x] Daily Treatment Note         [] Discharge Summary      Patient: Melanie Tobin   : 1955   MRN: [id number]   Date of Service:  2024  Admitting Diagnosis: Acute CVA (cerebrovascular accident) (HCC)  Current Admission Summary: Melanie Tobin is a 69 y.o.  who presented to the ED on  for c/o Hyperglycemia and AMS.  Patient has a PMHx: DM II and no PSHx on file.  Upon ED evaluation patient was a poor historian with altered mental status and unable to provide any information and history was provided by spouse.   stated she was having gradual cognitive changes for several months now.  He reported patient has been having difficulty with memory and having episodes of confusion.  However, the last 4 to 5 days her confusion has gotten worse to the point where she is not making sense of her speech.  Patient was taken to the emergency department at a different hospital by sister.  Was discharged with questionable urinary tract infection.  Patient followed up with primary care physician and was placed on antibiotics for urinary tract infection.  Patient has been sleeping more per .  She has also been having elevated blood pressure, poor appetite, yet increased blood glucose levels.  Upon ED evaluation she answered her name correctly. When asked other questions she stated, \"You sound like my mom and sister, all you have into sit on the roof if you want\".  She was easily distracted and tangential.   denied she had any fever, chills, cough, chest pain, or SOB.  Blood values were significant for BUN/Cr of 27/2.03; Glucose of 246; Ammonia level 23; UA had protein of 200 glucose of 100; Toxicology was negative.  CBC was normal and CT Head did not show any acute abnormality. MRI Brain was significant for acute punctate infarcts in left frontal lobe and left  benefits, plan of care, general safety  Learning Assessment:  patient will require reinforcement due to cognitive deficits    Assessment  Activity Tolerance: Fair; limited by cognition  Impairments Requiring Therapeutic Intervention: decreased functional mobility, decreased ADL status, decreased strength, decreased safety awareness, decreased cognition, decreased endurance, decreased balance, decreased IADL, decreased coordination  Prognosis: poor  Clinical Assessment: Patient with delayed processing for participation in activities with decreased command following.  Pt with also with decreased carryover of cues.  Pt requires SBA for functional mobility due to cognitive deficits.   Patient will benefit from continued skilled PT to improve mobility and transfers.   Safety Interventions: patient left in chair, chair alarm in place, call light within reach, and gait belt    Plan  Frequency: 5 x/week, 60 min/day  Current Treatment Recommendations: strengthening, balance training, functional mobility training, gait training, stair training, endurance training, neuromuscular re-education, and safety education    Goals  Patient Goals: Patient could not articulate goals   Short Term Goals:  Time Frame: 10-15 Days  Patient will complete bed mobility at [facility]   Patient will complete transfers at [facility]   Patient will ambulate 200 ft with use of no device at [facility]  Patient will ascend/descend 12 stairs with (B) handrail at modified independent  Patient will complete car transfer at [facility]    Above goals reviewed on 9/27/2024.  All goals are ongoing at this time unless indicated above.      Therapy Session Time      Individual Group Co-treatment   Time In  1015       Time Out 1115       Minutes  60           Timed Code Treatment Minutes:  60 Minute    Total Treatment Minutes:  60 Minutes      Electronically Signed By: Christoph Brown DPT [id number]

## 2024-09-27 NOTE — FLOWSHEET NOTE
09/27/24 0845   Vital Signs   Temp 98.4 °F (36.9 °C)   Temp Source Oral   Pulse 82   Heart Rate Source Monitor   Respirations 18   BP (!) 158/78   MAP (Calculated) 105   Oxygen Therapy   SpO2 93 %   O2 Device None (Room air)       Lab Results   Component Value Date/Time    WBC 8.4 09/27/2024 06:20 AM    HGB 11.3 (L) 09/27/2024 06:20 AM    HCT 34.3 (L) 09/27/2024 06:20 AM     09/27/2024 06:20 AM     09/27/2024 06:20 AM    K 4.2 09/27/2024 06:20 AM    BUN 25 (H) 09/27/2024 06:20 AM    CREATININE 1.8 (H) 09/27/2024 06:20 AM        AM Assessment completed.  Patient in bed.  Awake, Alert and oriented. Respirations easy unlabored.    Pain/Discomfort is being managed with PRN analgesics per MD orders (See MAR). Patient is able to express and rate pain using numerical scale.  Plan of care, education and safety measures reviewed and mutually agreed upon with the patient.   Needed items including call light in reach and exit alarm in place.

## 2024-09-27 NOTE — PLAN OF CARE
Problem: Discharge Planning  Goal: Discharge to home or other facility with appropriate resources  9/26/2024 2234 by Rody Franco RN  Outcome: Progressing  9/26/2024 1532 by Marah Crespo RN  Outcome: Progressing     Problem: Chronic Conditions and Co-morbidities  Goal: Patient's chronic conditions and co-morbidity symptoms are monitored and maintained or improved  9/26/2024 2234 by Rody Franco RN  Outcome: Progressing  9/26/2024 1532 by Marah Crespo RN  Outcome: Progressing     Problem: Skin/Tissue Integrity  Goal: Absence of new skin breakdown  Description: 1.  Monitor for areas of redness and/or skin breakdown  2.  Assess vascular access sites hourly  3.  Every 4-6 hours minimum:  Change oxygen saturation probe site  4.  Every 4-6 hours:  If on nasal continuous positive airway pressure, respiratory therapy assess nares and determine need for appliance change or resting period.  9/26/2024 2234 by Rody Franco RN  Outcome: Progressing  9/26/2024 1532 by Marah Crespo RN  Outcome: Progressing     Problem: Safety - Adult  Goal: Free from fall injury  9/26/2024 2234 by Rody Franco RN  Outcome: Progressing  9/26/2024 1532 by Marah Crespo RN  Outcome: Progressing     Problem: ABCDS Injury Assessment  Goal: Absence of physical injury  9/26/2024 2234 by Rody Franco RN  Outcome: Progressing  9/26/2024 1532 by Marah Crespo RN  Outcome: Progressing     Problem: Confusion  Goal: Confusion, delirium, dementia, or psychosis is improved or at baseline  Description: INTERVENTIONS:  1. Assess for possible contributors to thought disturbance, including medications, impaired vision or hearing, underlying metabolic abnormalities, dehydration, psychiatric diagnoses, and notify attending LIP  2. [place] high risk fall precautions, as indicated  3. Provide frequent short contacts to provide reality reorientation, refocusing and direction  4. Decrease environmental stimuli, including noise as appropriate  5.

## 2024-09-27 NOTE — PLAN OF CARE
Problem: Discharge Planning  Goal: Discharge to home or other facility with appropriate resources  Outcome: Progressing     Problem: Chronic Conditions and Co-morbidities  Goal: Patient's chronic conditions and co-morbidity symptoms are monitored and maintained or improved  Outcome: Progressing     Problem: Skin/Tissue Integrity  Goal: Absence of new skin breakdown  Description: 1.  Monitor for areas of redness and/or skin breakdown  2.  Assess vascular access sites hourly  3.  Every 4-6 hours minimum:  Change oxygen saturation probe site  4.  Every 4-6 hours:  If on nasal continuous positive airway pressure, respiratory therapy assess nares and determine need for appliance change or resting period.  Outcome: Progressing     Problem: Safety - Adult  Goal: Free from fall injury  Outcome: Progressing     Problem: ABCDS Injury Assessment  Goal: Absence of physical injury  Outcome: Progressing     Problem: Confusion  Goal: Confusion, delirium, dementia, or psychosis is improved or at baseline  Description: INTERVENTIONS:  1. Assess for possible contributors to thought disturbance, including medications, impaired vision or hearing, underlying metabolic abnormalities, dehydration, psychiatric diagnoses, and notify attending LIP  2. [place] high risk fall precautions, as indicated  3. Provide frequent short contacts to provide reality reorientation, refocusing and direction  4. Decrease environmental stimuli, including noise as appropriate  5. Monitor and intervene to maintain adequate nutrition, hydration, elimination, sleep and activity  6. If unable to ensure safety without constant attention obtain sitter and review sitter guidelines with assigned personnel  7. Initiate Psychosocial CNS and Spiritual Care consult, as indicated  Outcome: Progressing     Problem: Nutrition Deficit:  Goal: Optimize nutritional status  Outcome: Progressing     Problem: Risk for Elopement  Goal: Patient will not exit the unit/facility

## 2024-09-28 LAB
GLUCOSE BLD-MCNC: 155 MG/DL (ref 70–99)
GLUCOSE BLD-MCNC: 179 MG/DL (ref 70–99)
GLUCOSE BLD-MCNC: 208 MG/DL (ref 70–99)
GLUCOSE BLD-MCNC: 270 MG/DL (ref 70–99)
PERFORMED ON: ABNORMAL

## 2024-09-28 PROCEDURE — 97530 THERAPEUTIC ACTIVITIES: CPT

## 2024-09-28 PROCEDURE — 1280000000 HC REHAB R&B

## 2024-09-28 PROCEDURE — 97535 SELF CARE MNGMENT TRAINING: CPT

## 2024-09-28 PROCEDURE — 92507 TX SP LANG VOICE COMM INDIV: CPT

## 2024-09-28 PROCEDURE — 6370000000 HC RX 637 (ALT 250 FOR IP): Performed by: STUDENT IN AN ORGANIZED HEALTH CARE EDUCATION/TRAINING PROGRAM

## 2024-09-28 RX ADMIN — MELATONIN TAB 3 MG 3 MG: 3 TAB at 22:39

## 2024-09-28 RX ADMIN — AMLODIPINE BESYLATE 10 MG: 5 TABLET ORAL at 08:23

## 2024-09-28 RX ADMIN — MONTELUKAST SODIUM 10 MG: 10 TABLET, FILM COATED ORAL at 22:38

## 2024-09-28 RX ADMIN — ASPIRIN 81 MG: 81 TABLET, CHEWABLE ORAL at 08:23

## 2024-09-28 RX ADMIN — INSULIN GLARGINE 14 UNITS: 100 INJECTION, SOLUTION SUBCUTANEOUS at 22:39

## 2024-09-28 RX ADMIN — INSULIN LISPRO 2 UNITS: 100 INJECTION, SOLUTION INTRAVENOUS; SUBCUTANEOUS at 12:43

## 2024-09-28 RX ADMIN — POLYETHYLENE GLYCOL 3350 17 G: 17 POWDER, FOR SOLUTION ORAL at 08:23

## 2024-09-28 RX ADMIN — INSULIN LISPRO 1 UNITS: 100 INJECTION, SOLUTION INTRAVENOUS; SUBCUTANEOUS at 17:45

## 2024-09-28 RX ADMIN — VITAM B12 100 MCG: 100 TAB at 08:23

## 2024-09-28 RX ADMIN — ATORVASTATIN CALCIUM 80 MG: 80 TABLET, FILM COATED ORAL at 22:39

## 2024-09-28 NOTE — PROGRESS NOTES
Equipment: grab bar in shower  Toilet Height: one typical height and one extra high  Home Equipment: rolling walker, standard walker, hurricane, wheelchair, shower chair  Transfer Assistance: Independent without use of device  Ambulation Assistance: Independent without use of device  ADL Assistance: independent with all ADL's  IADL Assistance: independent with homemaking tasks -  assists as needed  Active :        [x] Yes                 [] No  Hand Dominance: [] Left                 [x] Right  Current Employment: retired.  Occupation: Teacher  Recent Falls: No     Available Assistance at Discharge: 24/7 physical assistance available from  - pt has lots of hobbies that brings him out of the home    Examination   Vision:   Patient states she wears glasses/contacts but does not know if they are for reading or all the time.   Hearing:   WFL        Subjective  General: Pt sitting in recliner on arrival. Pt was finishing her breakfast.    Pain: 0/10  Pain Interventions: not applicable       Functional Mobility  Bed Mobility:  Bed mobility not completed on this date.  Comments:  Transfers:  Sit to stand transfer: stand by assistance  Stand to sit transfer: stand by assistance  Comments: Poor initiation. Took about 5 minutes of consistent cueing to actually get up.  Finally stood after PT asked her to go wash her hands at the sink.   Ambulation:  Surface:level surface  Assistive Device: no device  Assistance: stand by assistance  Distance: 160'X2  Gait Mechanics: small stride length, medial/lateral sway, wider NALINI  Comments: very distracted and looking into all rooms on the R  Stair Mobility:  Stair mobility not completed on this date.  Comments:  Wheelchair Mobility:  No w/c mobility completed on this date.  Comments:  Balance:  Static Sitting Balance: fair (+): maintains balance at SBA/supervision without use of UE support  Dynamic Sitting Balance: fair (+): maintains balance at SBA/supervision  deficits    Assessment  Activity Tolerance: Fair; limited by cognition  Impairments Requiring Therapeutic Intervention: decreased functional mobility, decreased ADL status, decreased strength, decreased safety awareness, decreased cognition, decreased endurance, decreased balance, decreased IADL, decreased coordination  Prognosis: poor  Clinical Assessment: Poor initiation and delayed processing for participation in therapeutic activities. SBA for functional mobility due to cognitive deficits.  Continued skilled PT to improve safe mobility.   Safety Interventions: patient left in chair, chair alarm in place, call light within reach, and gait belt    Plan  Frequency: 5 x/week, 60 min/day  Current Treatment Recommendations: strengthening, balance training, functional mobility training, gait training, stair training, endurance training, neuromuscular re-education, and safety education    Goals  Patient Goals: Patient could not articulate goals   Short Term Goals:  Time Frame: 10-15 Days  Patient will complete bed mobility at [facility]   Patient will complete transfers at [facility]   Patient will ambulate 200 ft with use of no device at [facility]  Patient will ascend/descend 12 stairs with (B) handrail at modified independent  Patient will complete car transfer at [facility]    Above goals reviewed on 9/28/2024.  All goals are ongoing at this time unless indicated above.      Therapy Session Time      Individual Group Co-treatment   Time In 0845       Time Out 915       Minutes 30       Second Session Therapy Time:   Individual Concurrent Group Co-treatment   Time In 1315         Time Out 1345         Minutes 30             Timed Code Treatment Minutes:  60 Minute    Total Treatment Minutes:  60 Minutes      Electronically Signed By: Eden Villareal PT, DPT [id number]

## 2024-09-28 NOTE — PLAN OF CARE
Problem: Discharge Planning  Goal: Discharge to home or other facility with appropriate resources  9/28/2024 1305 by Rishabh Chi RN  Outcome: Progressing     Problem: Skin/Tissue Integrity  Goal: Absence of new skin breakdown  Description: 1.  Monitor for areas of redness and/or skin breakdown  2.  Assess vascular access sites hourly  3.  Every 4-6 hours minimum:  Change oxygen saturation probe site  4.  Every 4-6 hours:  If on nasal continuous positive airway pressure, respiratory therapy assess nares and determine need for appliance change or resting period.  9/28/2024 1305 by Rishabh Chi RN  Outcome: Progressing     Problem: Safety - Adult  Goal: Free from fall injury  9/28/2024 1305 by Rishabh Chi RN  Outcome: Progressing     Problem: ABCDS Injury Assessment  Goal: Absence of physical injury  9/28/2024 1305 by Rishabh Chi RN  Outcome: Progressing     Problem: ABCDS Injury Assessment  Goal: Absence of physical injury  9/28/2024 1305 by Rishabh Chi RN  Outcome: Progressing     Problem: Confusion  Goal: Confusion, delirium, dementia, or psychosis is improved or at baseline  Description: INTERVENTIONS:  1. Assess for possible contributors to thought disturbance, including medications, impaired vision or hearing, underlying metabolic abnormalities, dehydration, psychiatric diagnoses, and notify attending LIP  2. [place] high risk fall precautions, as indicated  3. Provide frequent short contacts to provide reality reorientation, refocusing and direction  4. Decrease environmental stimuli, including noise as appropriate  5. Monitor and intervene to maintain adequate nutrition, hydration, elimination, sleep and activity  6. If unable to ensure safety without constant attention obtain sitter and review sitter guidelines with assigned personnel  7. Initiate Psychosocial CNS and Spiritual Care consult, as indicated  9/28/2024 1305 by Rishabh Chi, RN  Outcome: Progressing     Problem: Nutrition

## 2024-09-28 NOTE — PROGRESS NOTES
[facility] - Inpatient Rehabilitation Department   Phone: [phone]    Speech Therapy    [] Initial Evaluation            [x] Daily Treatment Note         [] Discharge Summary      Patient: Melanie Tobin   : 1955   MRN: [id number]   Date of Service:  2024  Admitting Diagnosis: Acute CVA (cerebrovascular accident) (HCC)  Current Admission Summary: Melanie Tobin is a 69 y.o.  who presented to the ED on  for c/o Hyperglycemia and AMS.  Patient has a PMHx: DM II and no PSHx on file.  Upon ED evaluation patient was a poor historian with altered mental status and unable to provide any information and history was provided by spouse.   stated she was having gradual cognitive changes for several months now.  He reported patient has been having difficulty with memory and having episodes of confusion.  However, the last 4 to 5 days her confusion has gotten worse to the point where she is not making sense of her speech.  Patient was taken to the emergency department at a different hospital by sister.  Was discharged with questionable urinary tract infection.  Patient followed up with primary care physician and was placed on antibiotics for urinary tract infection.  Patient has been sleeping more per .  She has also been having elevated blood pressure, poor appetite, yet increased blood glucose levels.  Upon ED evaluation she answered her name correctly. When asked other questions she stated, \"You sound like my mom and sister, all you have into sit on the roof if you want\".  She was easily distracted and tangential.   denied she had any fever, chills, cough, chest pain, or SOB.  Blood values were significant for BUN/Cr of 27/2.03; Glucose of 246; Ammonia level 23; UA had protein of 200 glucose of 100; Toxicology was negative.  CBC was normal and CT Head did not show any acute abnormality. MRI Brain was significant for acute punctate infarcts in left frontal lobe and left  deficit    Clinical Assessment:  Pt presents with 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 Pt demonstrates moderate impairments in basic comprehension tasks and severe impairments in more complex comprehension tasks. Pt with error patterns in prolonged responses which required simplification and repetition. Pt demonstrates moderate impairments in basic verbal expression tasks and severe impairments in more complex verbal expression tasks. Pt demonstrates accurate word production with a non-fluent pattern and impairments in word finding. Cognitive domains seemingly impacted by pt's language based deficits are attention, memory, and orientation. Pt with slight improvements this date within contextual language comprehension and written expression. Pt would benefit from ongoing participation in speech therapy services to improve comprehension and expression.       Diet Solids Recommendation:   Liquid Consistency Recommendation:   Recommended Form of Meds:   Regular texture diet     Thin liquids     Meds whole with water           Plan  Frequency: 60 minutes/day; 5 days per week, as tolerated, until goals met, or discharged from ARU.  Therapeutic Interventions:  Expressive/ Receptive Language intervention , Compensatory Cognitive intervention , and Patient/ Family education     Discharge  Barriers to discharge: May need additional assistance to manage medications and/or finances (assistance/supervision)  Inability to effectively communicate in emergent situations (ex: calling 911, stating name, reduced intelligibility, etc)  Discharge Recommendations: 24 hour supervision and TBD   Continued SLP at Discharge: Yes     Goals  Patient Goals: Pt unable to express goals    Time Frame: 2 weeks      Pt will complete auditory comprehension tasks with 80% accuracy.   Pt will complete expressive language tasks with 80% accuracy or min cues   Pt will verbalize basic information

## 2024-09-28 NOTE — PROGRESS NOTES
[facility] - Inpatient Rehabilitation Department   Phone: [phone]    Occupational Therapy    [] Initial Evaluation            [x] Daily Treatment Note         [] Discharge Summary      Patient: Melanie Tobin   : 1955   MRN: [id number]   Date of Service:  2024    Admitting Diagnosis:  Acute CVA (cerebrovascular accident) (HCC)  Current Admission Summary: Melanie Tobin is a 69 y.o.  who presented to the ED on  for c/o Hyperglycemia and AMS.  Patient has a PMHx: DM II and no PSHx on file.  Upon ED evaluation patient was a poor historian with altered mental status and unable to provide any information and history was provided by spouse.   stated she was having gradual cognitive changes for several months now.  He reported patient has been having difficulty with memory and having episodes of confusion.  However, the last 4 to 5 days her confusion has gotten worse to the point where she is not making sense of her speech.  Patient was taken to the emergency department at a different hospital by sister.  Was discharged with questionable urinary tract infection.  Patient followed up with primary care physician and was placed on antibiotics for urinary tract infection.  Patient has been sleeping more per .  She has also been having elevated blood pressure, poor appetite, yet increased blood glucose levels.  Upon ED evaluation she answered her name correctly. When asked other questions she stated, \"You sound like my mom and sister, all you have into sit on the roof if you want\".  She was easily distracted and tangential.   denied she had any fever, chills, cough, chest pain, or SOB.  Blood values were significant for BUN/Cr of 27/2.03; Glucose of 246; Ammonia level 23; UA had protein of 200 glucose of 100; Toxicology was negative.  CBC was normal and CT Head did not show any acute abnormality. MRI Brain was significant for acute punctate infarcts in left frontal lobe and  left medial thalamus. SLP evaluated patient on 9/19, and she did not demonstrate any s/s of aspiration, and she was placed on a regular ADA diet.  Additionally, SLP performed a Cognitive assessment and noted moderate fluent expressive aphasia characterized by anomia and reduced verbal output and moderate-severe receptive aphasia characterized by reduced auditory comprehension in the domains of command following and answering yes/no questions. Repetition skills and automatic speech appear intact. Patient able to answer open-ended questions fluently but content is typically incorrect. She appears highly distractible w/ poor attention and only oriented to self.  An Echocardiogram was performed with demonstrated normal LVEF, no evidence of interatrial shunt.  An EEG was performed and was significant for intermittent focal slow activity in the form of irregular delta activity over the right frontal-temporal head region.       Currently the patient reports that she is doing well.  She denies any acute pain complaints.  She denies any cognitive or language concerns, vision changes, hearing loss, dysphagia, weakness, incoordination, sensory changes.  ROS limited due to aphasia.    Past Medical History:  has a past medical history of Cerebral artery occlusion with cerebral infarction (HCC), Diabetes mellitus (HCC), Hyperlipidemia, and Hypertension.  Past Surgical History:  has a past surgical history that includes eye surgery; fracture surgery; and joint replacement.    Discharge Recommendations: Anticipate home w/ HHOT and 24/7 SUP    DME Required For Discharge: DME to be determined pending patient progress    Precautions/Restrictions: high fall risk  Weight Bearing Restrictions: no restrictions  Required Braces/Orthotics: no braces required  Positional Restrictions:no positional restrictions    Pre-Admission Information (PLOF taken/corrected by  on 9/24)  Lives With: spouse                  Type of Home: house  Home

## 2024-09-28 NOTE — PROGRESS NOTES
Pt comfortably resting in chair. /85, nabeel med given. Denies any pain at this moment. Morning meds given per MAR. PWWW. AXOX3. Call light and bedside table in reach. Chair locked and alarm on. The care plan and education has been reviewed and mutually agreed upon with the patient.

## 2024-09-29 VITALS
TEMPERATURE: 98.1 F | BODY MASS INDEX: 39.08 KG/M2 | DIASTOLIC BLOOD PRESSURE: 85 MMHG | HEIGHT: 66 IN | RESPIRATION RATE: 16 BRPM | WEIGHT: 243.17 LBS | SYSTOLIC BLOOD PRESSURE: 149 MMHG | OXYGEN SATURATION: 95 % | HEART RATE: 80 BPM

## 2024-09-29 LAB
GLUCOSE BLD-MCNC: 125 MG/DL (ref 70–99)
GLUCOSE BLD-MCNC: 150 MG/DL (ref 70–99)
GLUCOSE BLD-MCNC: 210 MG/DL (ref 70–99)
GLUCOSE BLD-MCNC: 246 MG/DL (ref 70–99)
PERFORMED ON: ABNORMAL

## 2024-09-29 PROCEDURE — 6370000000 HC RX 637 (ALT 250 FOR IP): Performed by: STUDENT IN AN ORGANIZED HEALTH CARE EDUCATION/TRAINING PROGRAM

## 2024-09-29 PROCEDURE — 1280000000 HC REHAB R&B

## 2024-09-29 RX ORDER — LISINOPRIL 5 MG/1
5 TABLET ORAL DAILY
Status: ACTIVE | OUTPATIENT
Start: 2024-09-30

## 2024-09-29 RX ADMIN — MELATONIN TAB 3 MG 3 MG: 3 TAB at 20:17

## 2024-09-29 RX ADMIN — VITAM B12 100 MCG: 100 TAB at 10:28

## 2024-09-29 RX ADMIN — POLYETHYLENE GLYCOL 3350 17 G: 17 POWDER, FOR SOLUTION ORAL at 10:28

## 2024-09-29 RX ADMIN — INSULIN LISPRO 1 UNITS: 100 INJECTION, SOLUTION INTRAVENOUS; SUBCUTANEOUS at 17:36

## 2024-09-29 RX ADMIN — AMLODIPINE BESYLATE 10 MG: 5 TABLET ORAL at 10:28

## 2024-09-29 RX ADMIN — MONTELUKAST SODIUM 10 MG: 10 TABLET, FILM COATED ORAL at 20:17

## 2024-09-29 RX ADMIN — INSULIN GLARGINE 14 UNITS: 100 INJECTION, SOLUTION SUBCUTANEOUS at 20:37

## 2024-09-29 RX ADMIN — ATORVASTATIN CALCIUM 80 MG: 80 TABLET, FILM COATED ORAL at 20:17

## 2024-09-29 RX ADMIN — ASPIRIN 81 MG: 81 TABLET, CHEWABLE ORAL at 10:28

## 2024-09-29 NOTE — PROGRESS NOTES
Melanie Tobin  9/28/2024  [id number]    Chief Complaint: Acute CVA (cerebrovascular accident) (HCC)    Subjective     Patient states that she is feeling well and denies any new onset complaints.    Last BM: Stool Occurrence: 0 (09/27/24 2059)        Objective    Patient Vitals for the past 24 hrs:   BP Temp Temp src Pulse Resp SpO2 Weight   09/28/24 0815 (!) 150/85 97.7 °F (36.5 °C) Oral 87 18 96 % --   09/28/24 0645 -- -- -- -- -- -- 111.5 kg (245 lb 13 oz)   09/27/24 2344 (!) 157/83 98.4 °F (36.9 °C) Oral 82 18 96 % --     Gen: No distress, pleasant.   HEENT: Normocephalic, atraumatic.   CV: Regular rate and rhythm. Extremities warm, well perfused.   Resp: No respiratory distress. CTAB.  Abd: Soft, nontender.  Ext: No edema.   Neuro: Alert, appropriate responses to simple yes/no questions.    Laboratory data: Available via EMR.     Therapy progress:       PT    Supine to Sit: Supervision or touching assistance  Sit to Supine: Supervision or touching assistance   Sit to Stand: Supervision or touching assistance  Chair/Bed to Chair Transfer: Supervision or touching assistance  Car Transfer: Supervision or touching assistance  Ambulation 10 ft: Supervision or touching assistance  Ambulation 50 ft: Supervision or touching assistance  Ambulation 150 ft:    Stairs - 1 Step: Supervision or touching assistance  Stairs - 4 Step: Supervision or touching assistance  Stairs - 12 Step:      OT    Eating:    Oral Hygiene: Supervision or touching assistance  Bathing: Supervision or touching assistance  Upper Body Dressing: Supervision or touching assistance  Lower Body Dressing: Partial/moderate assistance  Toilet Transfer: Supervision or touching assistance  Toilet Hygiene: Supervision or touching assistance    Speech Therapy    Pt presents with 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 Pt demonstrates moderate impairments in basic comprehension tasks and severe impairments in

## 2024-09-29 NOTE — PROGRESS NOTES
Pt comfortably resting in chair. VSS. Denies any pain at this moment. Morning meds given per MAR. PWWW. AXOX1. Pt encouraged to eat and drink. Call light and bedside table in reach. Chair locked and alarm on. The care plan and education has been reviewed and mutually agreed upon with the patient.

## 2024-09-30 LAB
ANION GAP SERPL CALCULATED.3IONS-SCNC: 14 MMOL/L (ref 3–16)
BASOPHILS # BLD: 0.1 K/UL (ref 0–0.2)
BASOPHILS NFR BLD: 0.7 %
BUN SERPL-MCNC: 24 MG/DL (ref 7–20)
CALCIUM SERPL-MCNC: 9.3 MG/DL (ref 8.3–10.6)
CHLORIDE SERPL-SCNC: 101 MMOL/L (ref 99–110)
CO2 SERPL-SCNC: 24 MMOL/L (ref 21–32)
CREAT SERPL-MCNC: 1.8 MG/DL (ref 0.6–1.2)
DEPRECATED RDW RBC AUTO: 14 % (ref 12.4–15.4)
EOSINOPHIL # BLD: 0.1 K/UL (ref 0–0.6)
EOSINOPHIL NFR BLD: 1.2 %
GFR SERPLBLD CREATININE-BSD FMLA CKD-EPI: 30 ML/MIN/{1.73_M2}
GLUCOSE BLD-MCNC: 125 MG/DL (ref 70–99)
GLUCOSE BLD-MCNC: 213 MG/DL (ref 70–99)
GLUCOSE BLD-MCNC: 217 MG/DL (ref 70–99)
GLUCOSE BLD-MCNC: 248 MG/DL (ref 70–99)
GLUCOSE SERPL-MCNC: 126 MG/DL (ref 70–99)
HCT VFR BLD AUTO: 34.4 % (ref 36–48)
HGB BLD-MCNC: 11.6 G/DL (ref 12–16)
LYMPHOCYTES # BLD: 2.6 K/UL (ref 1–5.1)
LYMPHOCYTES NFR BLD: 25.9 %
MCH RBC QN AUTO: 29.7 PG (ref 26–34)
MCHC RBC AUTO-ENTMCNC: 33.8 G/DL (ref 31–36)
MCV RBC AUTO: 88 FL (ref 80–100)
MONOCYTES # BLD: 0.8 K/UL (ref 0–1.3)
MONOCYTES NFR BLD: 7.6 %
NEUTROPHILS # BLD: 6.6 K/UL (ref 1.7–7.7)
NEUTROPHILS NFR BLD: 64.6 %
PERFORMED ON: ABNORMAL
PLATELET # BLD AUTO: 227 K/UL (ref 135–450)
PMV BLD AUTO: 11.2 FL (ref 5–10.5)
POTASSIUM SERPL-SCNC: 3.9 MMOL/L (ref 3.5–5.1)
RBC # BLD AUTO: 3.91 M/UL (ref 4–5.2)
SODIUM SERPL-SCNC: 139 MMOL/L (ref 136–145)
WBC # BLD AUTO: 10.2 K/UL (ref 4–11)

## 2024-09-30 PROCEDURE — 1280000000 HC REHAB R&B

## 2024-09-30 PROCEDURE — 6370000000 HC RX 637 (ALT 250 FOR IP): Performed by: STUDENT IN AN ORGANIZED HEALTH CARE EDUCATION/TRAINING PROGRAM

## 2024-09-30 PROCEDURE — 85025 COMPLETE CBC W/AUTO DIFF WBC: CPT

## 2024-09-30 PROCEDURE — 80048 BASIC METABOLIC PNL TOTAL CA: CPT

## 2024-09-30 PROCEDURE — 97535 SELF CARE MNGMENT TRAINING: CPT

## 2024-09-30 PROCEDURE — 97530 THERAPEUTIC ACTIVITIES: CPT

## 2024-09-30 PROCEDURE — 36415 COLL VENOUS BLD VENIPUNCTURE: CPT

## 2024-09-30 PROCEDURE — 97129 THER IVNTJ 1ST 15 MIN: CPT

## 2024-09-30 PROCEDURE — 92507 TX SP LANG VOICE COMM INDIV: CPT

## 2024-09-30 RX ORDER — INSULIN GLARGINE 100 [IU]/ML
16 INJECTION, SOLUTION SUBCUTANEOUS NIGHTLY
Status: DISCONTINUED | OUTPATIENT
Start: 2024-09-30 | End: 2024-10-01

## 2024-09-30 RX ORDER — LISINOPRIL 10 MG/1
10 TABLET ORAL DAILY
Status: DISCONTINUED | OUTPATIENT
Start: 2024-10-01 | End: 2024-10-03 | Stop reason: HOSPADM

## 2024-09-30 RX ADMIN — INSULIN GLARGINE 16 UNITS: 100 INJECTION, SOLUTION SUBCUTANEOUS at 20:53

## 2024-09-30 RX ADMIN — VITAM B12 100 MCG: 100 TAB at 09:27

## 2024-09-30 RX ADMIN — INSULIN LISPRO 1 UNITS: 100 INJECTION, SOLUTION INTRAVENOUS; SUBCUTANEOUS at 12:13

## 2024-09-30 RX ADMIN — MELATONIN TAB 3 MG 3 MG: 3 TAB at 20:57

## 2024-09-30 RX ADMIN — INSULIN LISPRO 1 UNITS: 100 INJECTION, SOLUTION INTRAVENOUS; SUBCUTANEOUS at 18:09

## 2024-09-30 RX ADMIN — AMLODIPINE BESYLATE 10 MG: 5 TABLET ORAL at 09:27

## 2024-09-30 RX ADMIN — MONTELUKAST SODIUM 10 MG: 10 TABLET, FILM COATED ORAL at 20:57

## 2024-09-30 RX ADMIN — POLYETHYLENE GLYCOL 3350 17 G: 17 POWDER, FOR SOLUTION ORAL at 09:27

## 2024-09-30 RX ADMIN — LISINOPRIL 5 MG: 5 TABLET ORAL at 09:27

## 2024-09-30 RX ADMIN — POLYETHYLENE GLYCOL 3350 17 G: 17 POWDER, FOR SOLUTION ORAL at 20:33

## 2024-09-30 RX ADMIN — ASPIRIN 81 MG: 81 TABLET, CHEWABLE ORAL at 09:27

## 2024-09-30 RX ADMIN — ATORVASTATIN CALCIUM 80 MG: 80 TABLET, FILM COATED ORAL at 20:57

## 2024-09-30 NOTE — PROGRESS NOTES
Minute    Total Treatment Minutes:  60 Minutes      Electronically Signed By: Christoph Brown, DPT [id number]

## 2024-09-30 NOTE — PROGRESS NOTES
Nutrition Note    RECOMMENDATIONS  PO Diet: [facility]  ONS: d/c  Glucerna      NUTRITION ASSESSMENT   Nutrition intervention triggered for f/u.  Pt receives a CCC diet & is eating % of meals per RN.  RN states po intake is good but pt requires cues to initiate intake.  Per EMR, po intake greater than 50% of meals.  Pt also receives Glucerna supplements, but is refusing.  Will d/c as po intake greater than 50% of meals.  Will follow for further needs as identified.     Nutrition Related Findings: No edema noted; aphasic; gluc 126; LBM 9/30  Wounds: None  Nutrition Education:  Education not indicated   Nutrition Goals: PO intake 50% or greater, by next RD assessment     MALNUTRITION ASSESSMENT   Acute Illness  Malnutrition Status: No malnutrition    NUTRITION DIAGNOSIS   Increased nutrient needs related to increase demand for energy/nutrients as evidenced by other (comment) (increased nutrient needs for strength & conditioning)      CURRENT NUTRITION THERAPIES  ADULT DIET; Regular; 4 carb choices (60 gm/meal)  ADULT ORAL NUTRITION SUPPLEMENT; Breakfast, Lunch, Dinner; Diabetic Oral Supplement     PO Intake: 51-75%, %   PO Supplement Intake:Refusing to take    ANTHROPOMETRICS  Current Height: 167.6 cm (5' 5.98\")  Current Weight - Scale: 110.3 kg (243 lb 2.7 oz)    Ideal Body Weight (IBW): 130 lbs  (59 kg)        BMI: 39.2      COMPARATIVE STANDARDS  Total Energy Requirements (kcals/day): 1639 - 1967     Protein (g):  110       Fluid (mL/day):  1639 - 1967    The patient will be monitored per nutrition standards of care. Consult dietitian if additional nutrition interventions are needed prior to RD reassessment.     Lindsay Whaley RD, LD    Contact: [phone]

## 2024-09-30 NOTE — CARE COORDINATION
NIESHA met with pt's sister in the room to discuss HHC and discharge planning.  NIESHA informed that [facility] accepted for home services.  NIESHA then asked about the recommended 24/7 care by therapy and Dr. Torres d/t pt's difficulties with cognition.  Sister stated she did not think that pt's spouse would be able to assist with all of the 24/7 care.  Stated she will call other family members to discuss this need and get back to me if they are able to provide this need.  NIESHA provided my direct number and asked to call me by tomorrow.    Electronically signed by NIYA Hodge, KELYW on 9/30/2024 at 5:09 PM

## 2024-09-30 NOTE — PROGRESS NOTES
At 0800, the pt stated wanting to use restroom.  The pt sat at the edge of bed for a long time instead of standing up even with cues.  The pt urinated and had formed stool but was not aware of it.  About 2 hours later, the pt's brief was saturated with urine.  The pt had BM in toilet but again was not aware of it.

## 2024-09-30 NOTE — PROGRESS NOTES
comprehension and expression. Pt demonstrates moderate impairments in basic comprehension tasks and severe impairments in more complex comprehension tasks. Pt with error patterns in prolonged responses which required simplification and repetition. Pt demonstrates moderate impairments in basic verbal expression tasks and severe impairments in more complex verbal expression tasks. Pt demonstrates accurate word production with a non-fluent pattern and impairments in word finding. Cognitive domains seemingly impacted by pt's language based deficits are attention, memory, and orientation. Pt with slight improvements this date within contextual language comprehension and written expression. Pt would benefit from ongoing participation in speech therapy services to improve comprehension and expression.    Body mass index is 39.27 kg/m².        Assessment/Plan:  Functional progress: Continues to be limited by poor initiation/attention.   This patient continues to require an ARU level of care from all disciplines to address the following issues:    #. Left frontal and thalamic ischemic CVA  - Suspected etiology is small vessel disease vs cardioembolic.  - MRI w/ punctate acute cortical infarct in the parasagittal left frontal lobe and in the medial left thalamus.  - Continue aspirin 81 mg daily, atorvastatin 80 mg nightly  - Optimization of comorbid HTN and DM2  - 30 day event monitor (ordered per [facility], but did not arrive with patient)  - SLP for cog/language    #. Encephalopathy  - Right temporal slowing noted on EEG, but no epileptiform activity  - LP deferred at outside hospital  - Continue vitamin B12 supplementation.  Thiamine levels pending.  - ANCA panel WNL, titers negative (obtained via [facility] Every[facility])  - Outpatient neuropsychological testing has been recommended  - Sleep-wake cycle regulation.   - May consider trial of low-dose stimulant to improve attention/initiation, will discuss low-dose methylphenidate 2.5 mg

## 2024-09-30 NOTE — PROGRESS NOTES
thorough w/ combing hair.   Upper Extremity Bathing: supervision  Lower Extremity Bathing: minimal assistance   Bathing Comments: assistance for buttocks d/t unable to follow cues despite 4x attempts and visual/tactile cues to complete  Upper Extremity Dressing: setup assistance  Lower Extremity Dressing: stand by assistance requires verbal cueing Increased time to complete task  Dressing Comments: SBA w/ cues for all dressing tasks - cues for sequencing and initiation; increased time and cues to doff clothing compared to donning; SBA for balance for pant/brief management over/under hips while in stance; randee/doff socks/tennis shoes in figure four w/ set-up assistance  Toileting: stand by assistance.    Toileting Equipment: none  Toileting Comments: clothing management up/down over hips w/ SUP, did not complete radha-care and unaware of BM while seated on commode, brief w/ small wet spot however pt changed brief in preparation for showering  General Comments: significant time and max verbal cues for all ADL completion, increased time provided for processing time  Instrumental Activities of Daily Living  No IADL completed on this date.  Functional Mobility  Bed Mobility:  Bed mobility not completed on this date.  Comments:  Transfers:  Sit to stand transfer:contact guard assistance  Stand to sit transfer: stand by assistance  Toilet transfer: stand by assistance  Toilet transfer equipment: standard toilet, grab bars  Toilet transfer comments: use of grab bar on R  Shower transfer: stand by assistance  Shower transfer equipment: shower seat with back  Shower transfer comments: use of grab bar on R  Comments:   Functional Mobility  Functional Mobility Activity: to/from bathroom  Device Use: no device  Required Assistance: stand by assistance  Balance:  Static Sitting Balance: good: independent with functional balance in unsupported position  Dynamic Sitting Balance: good: independent with functional balance in

## 2024-09-30 NOTE — PROGRESS NOTES
Melanie Tobin  9/29/2024  [id number]    Chief Complaint: Acute CVA (cerebrovascular accident) (HCC)    Subjective     Patient states that she is feeling well and denies any new onset complaints.    Last BM: Stool Occurrence: 0 (09/29/24 0354)        Objective    Patient Vitals for the past 24 hrs:   BP Temp Temp src Pulse Resp SpO2 Weight   09/29/24 2015 (!) 149/85 98.1 °F (36.7 °C) Oral 80 16 95 % --   09/29/24 1015 (!) 146/91 98.3 °F (36.8 °C) Oral 87 16 95 % --   09/29/24 0608 -- -- -- -- -- -- 110.3 kg (243 lb 2.7 oz)   09/28/24 2230 (!) 166/78 98 °F (36.7 °C) Oral 84 18 96 % --     Gen: No distress, pleasant.   HEENT: Normocephalic, atraumatic.   CV: Regular rate and rhythm. Extremities warm, well perfused.   Resp: No respiratory distress. CTAB.  Abd: Soft, nontender.  Ext: No edema.   Neuro: Alert, appropriate responses to simple yes/no questions.    Laboratory data: Available via EMR.     Therapy progress:       PT    Supine to Sit: Supervision or touching assistance  Sit to Supine: Supervision or touching assistance   Sit to Stand: Supervision or touching assistance  Chair/Bed to Chair Transfer: Supervision or touching assistance  Car Transfer: Supervision or touching assistance  Ambulation 10 ft: Supervision or touching assistance  Ambulation 50 ft: Supervision or touching assistance  Ambulation 150 ft:    Stairs - 1 Step: Supervision or touching assistance  Stairs - 4 Step: Supervision or touching assistance  Stairs - 12 Step:      OT    Eating:    Oral Hygiene: Supervision or touching assistance  Bathing: Supervision or touching assistance  Upper Body Dressing: Supervision or touching assistance  Lower Body Dressing: Partial/moderate assistance  Toilet Transfer: Supervision or touching assistance  Toilet Hygiene: Supervision or touching assistance    Speech Therapy    Pt presents with 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 Pt demonstrates moderate

## 2024-09-30 NOTE — PROGRESS NOTES
[facility] - Inpatient Rehabilitation Department   Phone: [phone]    Speech Therapy    [] Initial Evaluation            [x] Daily Treatment Note         [] Discharge Summary      Patient: Melanie Tobin   : 1955   MRN: [id number]   Date of Service:  2024  Admitting Diagnosis: Acute CVA (cerebrovascular accident) (HCC)  Current Admission Summary: Melanie Tobin is a 69 y.o.  who presented to the ED on  for c/o Hyperglycemia and AMS.  Patient has a PMHx: DM II and no PSHx on file.  Upon ED evaluation patient was a poor historian with altered mental status and unable to provide any information and history was provided by spouse.   stated she was having gradual cognitive changes for several months now.  He reported patient has been having difficulty with memory and having episodes of confusion.  However, the last 4 to 5 days her confusion has gotten worse to the point where she is not making sense of her speech.  Patient was taken to the emergency department at a different hospital by sister.  Was discharged with questionable urinary tract infection.  Patient followed up with primary care physician and was placed on antibiotics for urinary tract infection.  Patient has been sleeping more per .  She has also been having elevated blood pressure, poor appetite, yet increased blood glucose levels.  Upon ED evaluation she answered her name correctly. When asked other questions she stated, \"You sound like my mom and sister, all you have into sit on the roof if you want\".  She was easily distracted and tangential.   denied she had any fever, chills, cough, chest pain, or SOB.  Blood values were significant for BUN/Cr of 27/2.03; Glucose of 246; Ammonia level 23; UA had protein of 200 glucose of 100; Toxicology was negative.  CBC was normal and CT Head did not show any acute abnormality. MRI Brain was significant for acute punctate infarcts in left frontal lobe and left

## 2024-10-01 LAB
BACTERIA URNS QL MICRO: ABNORMAL /HPF
BILIRUB UR QL STRIP.AUTO: NEGATIVE
CLARITY UR: ABNORMAL
COLOR UR: YELLOW
EPI CELLS #/AREA URNS AUTO: 7 /HPF (ref 0–5)
GLUCOSE BLD-MCNC: 147 MG/DL (ref 70–99)
GLUCOSE BLD-MCNC: 193 MG/DL (ref 70–99)
GLUCOSE BLD-MCNC: 206 MG/DL (ref 70–99)
GLUCOSE BLD-MCNC: 241 MG/DL (ref 70–99)
GLUCOSE UR STRIP.AUTO-MCNC: 500 MG/DL
HGB UR QL STRIP.AUTO: ABNORMAL
HYALINE CASTS #/AREA URNS AUTO: 15 /LPF (ref 0–8)
KETONES UR STRIP.AUTO-MCNC: NEGATIVE MG/DL
LEUKOCYTE ESTERASE UR QL STRIP.AUTO: ABNORMAL
NITRITE UR QL STRIP.AUTO: POSITIVE
PERFORMED ON: ABNORMAL
PH UR STRIP.AUTO: 5 [PH] (ref 5–8)
PROT UR STRIP.AUTO-MCNC: 300 MG/DL
RBC CLUMPS #/AREA URNS AUTO: 2 /HPF (ref 0–4)
SP GR UR STRIP.AUTO: 1.02 (ref 1–1.03)
UA COMPLETE W REFLEX CULTURE PNL UR: YES
UA DIPSTICK W REFLEX MICRO PNL UR: YES
URN SPEC COLLECT METH UR: ABNORMAL
UROBILINOGEN UR STRIP-ACNC: 1 E.U./DL
WBC #/AREA URNS AUTO: 797 /HPF (ref 0–5)

## 2024-10-01 PROCEDURE — 6370000000 HC RX 637 (ALT 250 FOR IP): Performed by: STUDENT IN AN ORGANIZED HEALTH CARE EDUCATION/TRAINING PROGRAM

## 2024-10-01 PROCEDURE — 97530 THERAPEUTIC ACTIVITIES: CPT

## 2024-10-01 PROCEDURE — 87186 SC STD MICRODIL/AGAR DIL: CPT

## 2024-10-01 PROCEDURE — 87086 URINE CULTURE/COLONY COUNT: CPT

## 2024-10-01 PROCEDURE — 97535 SELF CARE MNGMENT TRAINING: CPT

## 2024-10-01 PROCEDURE — 97129 THER IVNTJ 1ST 15 MIN: CPT

## 2024-10-01 PROCEDURE — 97130 THER IVNTJ EA ADDL 15 MIN: CPT

## 2024-10-01 PROCEDURE — 1280000000 HC REHAB R&B

## 2024-10-01 PROCEDURE — 92507 TX SP LANG VOICE COMM INDIV: CPT

## 2024-10-01 PROCEDURE — 87088 URINE BACTERIA CULTURE: CPT

## 2024-10-01 PROCEDURE — 81001 URINALYSIS AUTO W/SCOPE: CPT

## 2024-10-01 RX ORDER — INSULIN GLARGINE 100 [IU]/ML
18 INJECTION, SOLUTION SUBCUTANEOUS NIGHTLY
Status: DISCONTINUED | OUTPATIENT
Start: 2024-10-01 | End: 2024-10-02

## 2024-10-01 RX ORDER — METHYLPHENIDATE HYDROCHLORIDE 5 MG/1
2.5 TABLET ORAL
Status: DISCONTINUED | OUTPATIENT
Start: 2024-10-01 | End: 2024-10-01

## 2024-10-01 RX ORDER — CEFDINIR 300 MG/1
300 CAPSULE ORAL EVERY 12 HOURS SCHEDULED
Status: DISCONTINUED | OUTPATIENT
Start: 2024-10-01 | End: 2024-10-03 | Stop reason: HOSPADM

## 2024-10-01 RX ADMIN — LISINOPRIL 10 MG: 10 TABLET ORAL at 08:26

## 2024-10-01 RX ADMIN — MELATONIN TAB 3 MG 3 MG: 3 TAB at 20:07

## 2024-10-01 RX ADMIN — AMLODIPINE BESYLATE 10 MG: 5 TABLET ORAL at 08:26

## 2024-10-01 RX ADMIN — INSULIN LISPRO 1 UNITS: 100 INJECTION, SOLUTION INTRAVENOUS; SUBCUTANEOUS at 17:18

## 2024-10-01 RX ADMIN — ATORVASTATIN CALCIUM 80 MG: 80 TABLET, FILM COATED ORAL at 20:09

## 2024-10-01 RX ADMIN — INSULIN GLARGINE 18 UNITS: 100 INJECTION, SOLUTION SUBCUTANEOUS at 20:07

## 2024-10-01 RX ADMIN — POLYETHYLENE GLYCOL 3350 17 G: 17 POWDER, FOR SOLUTION ORAL at 08:27

## 2024-10-01 RX ADMIN — CEFDINIR 300 MG: 300 CAPSULE ORAL at 20:07

## 2024-10-01 RX ADMIN — VITAM B12 100 MCG: 100 TAB at 08:26

## 2024-10-01 RX ADMIN — ASPIRIN 81 MG: 81 TABLET, CHEWABLE ORAL at 08:26

## 2024-10-01 RX ADMIN — MONTELUKAST SODIUM 10 MG: 10 TABLET, FILM COATED ORAL at 20:11

## 2024-10-01 NOTE — PROGRESS NOTES
UTI. We as a medical team, and I as the physician , made a plan to work on these barriers to facilitate safe discharge. Plan will be presented to patient/family (if available).       Atlon Torres MD  10/1/2024, 5:59 PM    * This document was created using dictation software.  While all precautions were taken to ensure accuracy, errors may have occurred.  Please disregard any typographical errors.

## 2024-10-01 NOTE — PROGRESS NOTES
medications and/or finances (assistance/supervision)  Inability to effectively communicate in emergent situations (ex: calling 911, stating name, reduced intelligibility, etc)  Discharge Recommendations: 24 hour supervision  Continued SLP at Discharge: Yes     Goals  Patient Goals: Pt unable to express goals    Time Frame: 2 weeks    Pt will complete auditory comprehension tasks with 80% accuracy.   Pt will complete expressive language tasks with 80% accuracy or min cues   Pt will verbalize basic information (personal history etc.) with 80% accuracy or min cues using any communication modality.  Patient will complete verbal description and word retrieval tasks with 80% accuracy or given min verbal cues  Patient will demonstrate insight into limitations and impact on daily functioning with min cues.    Above goals reviewed on 10/1/2024. All goals are ongoing at this time unless indicated above.       Therapy Session Time      Session 1 Session 2   Time In 0835 1330   Time Out 0905 1400   Time Code Minutes 12 20   Individual Minutes 30 30     Timed Code Treatment Minutes:  32  Total Treatment Minutes:  60    Electronically Signed By:   Taya Johnson M.A. [facility] [id number]  Speech-Language Pathologist   10/1/2024 9:09 AM

## 2024-10-01 NOTE — PLAN OF CARE
Problem: Discharge Planning  Goal: Discharge to home or other facility with appropriate resources  Outcome: Progressing     Problem: Chronic Conditions and Co-morbidities  Goal: Patient's chronic conditions and co-morbidity symptoms are monitored and maintained or improved  Outcome: Progressing     Problem: Skin/Tissue Integrity  Goal: Absence of new skin breakdown  Description: 1.  Monitor for areas of redness and/or skin breakdown  2.  Assess vascular access sites hourly  3.  Every 4-6 hours minimum:  Change oxygen saturation probe site  4.  Every 4-6 hours:  If on nasal continuous positive airway pressure, respiratory therapy assess nares and determine need for appliance change or resting period.  Outcome: Progressing     Problem: Safety - Adult  Goal: Free from fall injury  Outcome: Progressing     Problem: ABCDS Injury Assessment  Goal: Absence of physical injury  Outcome: Progressing     Problem: Confusion  Goal: Confusion, delirium, dementia, or psychosis is improved or at baseline  Description: INTERVENTIONS:  1. Assess for possible contributors to thought disturbance, including medications, impaired vision or hearing, underlying metabolic abnormalities, dehydration, psychiatric diagnoses, and notify attending LIP  2. [place] high risk fall precautions, as indicated  3. Provide frequent short contacts to provide reality reorientation, refocusing and direction  4. Decrease environmental stimuli, including noise as appropriate  5. Monitor and intervene to maintain adequate nutrition, hydration, elimination, sleep and activity  6. If unable to ensure safety without constant attention obtain sitter and review sitter guidelines with assigned personnel  7. Initiate Psychosocial CNS and Spiritual Care consult, as indicated  Outcome: Progressing     Problem: Nutrition Deficit:  Goal: Optimize nutritional status  Outcome: Progressing     Problem: Risk for Elopement  Goal: Patient will not exit the unit/facility

## 2024-10-01 NOTE — PROGRESS NOTES
[facility] - Inpatient Rehabilitation Department   Phone: [phone]    Physical Therapy    [] Initial Evaluation            [x] Daily Treatment Note         [] Discharge Summary      Patient: Melanie Tobin   : 1955   MRN: [id number]   Date of Service:  10/1/2024  Admitting Diagnosis: Acute CVA (cerebrovascular accident) (HCC)  Current Admission Summary: Melanie Tobin is a 69 y.o.  who presented to the ED on  for c/o Hyperglycemia and AMS.  Patient has a PMHx: DM II and no PSHx on file.  Upon ED evaluation patient was a poor historian with altered mental status and unable to provide any information and history was provided by spouse.   stated she was having gradual cognitive changes for several months now.  He reported patient has been having difficulty with memory and having episodes of confusion.  However, the last 4 to 5 days her confusion has gotten worse to the point where she is not making sense of her speech.  Patient was taken to the emergency department at a different hospital by sister.  Was discharged with questionable urinary tract infection.  Patient followed up with primary care physician and was placed on antibiotics for urinary tract infection.  Patient has been sleeping more per .  She has also been having elevated blood pressure, poor appetite, yet increased blood glucose levels.  Upon ED evaluation she answered her name correctly. When asked other questions she stated, \"You sound like my mom and sister, all you have into sit on the roof if you want\".  She was easily distracted and tangential.   denied she had any fever, chills, cough, chest pain, or SOB.  Blood values were significant for BUN/Cr of 27/2.03; Glucose of 246; Ammonia level 23; UA had protein of 200 glucose of 100; Toxicology was negative.  CBC was normal and CT Head did not show any acute abnormality. MRI Brain was significant for acute punctate infarcts in left frontal lobe and left

## 2024-10-01 NOTE — PROGRESS NOTES
Assisted the pt to walk ARU [place] once time per the sister's request.  The pt was alert and cooperative.

## 2024-10-01 NOTE — PROGRESS NOTES
[facility] - Inpatient Rehabilitation Department   Phone: [phone]    Occupational Therapy    [] Initial Evaluation            [x] Daily Treatment Note         [] Discharge Summary      Patient: Melanie Tobin   : 1955   MRN: [id number]   Date of Service:  10/1/2024    Admitting Diagnosis:  Acute CVA (cerebrovascular accident) (HCC)  Current Admission Summary: Melanie Tobin is a 69 y.o.  who presented to the ED on  for c/o Hyperglycemia and AMS.  Patient has a PMHx: DM II and no PSHx on file.  Upon ED evaluation patient was a poor historian with altered mental status and unable to provide any information and history was provided by spouse.   stated she was having gradual cognitive changes for several months now.  He reported patient has been having difficulty with memory and having episodes of confusion.  However, the last 4 to 5 days her confusion has gotten worse to the point where she is not making sense of her speech.  Patient was taken to the emergency department at a different hospital by sister.  Was discharged with questionable urinary tract infection.  Patient followed up with primary care physician and was placed on antibiotics for urinary tract infection.  Patient has been sleeping more per .  She has also been having elevated blood pressure, poor appetite, yet increased blood glucose levels.  Upon ED evaluation she answered her name correctly. When asked other questions she stated, \"You sound like my mom and sister, all you have into sit on the roof if you want\".  She was easily distracted and tangential.   denied she had any fever, chills, cough, chest pain, or SOB.  Blood values were significant for BUN/Cr of 27/2.03; Glucose of 246; Ammonia level 23; UA had protein of 200 glucose of 100; Toxicology was negative.  CBC was normal and CT Head did not show any acute abnormality. MRI Brain was significant for acute punctate infarcts in left frontal lobe and

## 2024-10-02 LAB
ANION GAP SERPL CALCULATED.3IONS-SCNC: 13 MMOL/L (ref 3–16)
BASOPHILS # BLD: 0 K/UL (ref 0–0.2)
BASOPHILS NFR BLD: 0.5 %
BUN SERPL-MCNC: 28 MG/DL (ref 7–20)
CALCIUM SERPL-MCNC: 9.1 MG/DL (ref 8.3–10.6)
CHLORIDE SERPL-SCNC: 105 MMOL/L (ref 99–110)
CO2 SERPL-SCNC: 23 MMOL/L (ref 21–32)
CREAT SERPL-MCNC: 1.9 MG/DL (ref 0.6–1.2)
DEPRECATED RDW RBC AUTO: 14.1 % (ref 12.4–15.4)
EOSINOPHIL # BLD: 0.1 K/UL (ref 0–0.6)
EOSINOPHIL NFR BLD: 1.3 %
GFR SERPLBLD CREATININE-BSD FMLA CKD-EPI: 28 ML/MIN/{1.73_M2}
GLUCOSE BLD-MCNC: 157 MG/DL (ref 70–99)
GLUCOSE BLD-MCNC: 229 MG/DL (ref 70–99)
GLUCOSE BLD-MCNC: 263 MG/DL (ref 70–99)
GLUCOSE BLD-MCNC: 276 MG/DL (ref 70–99)
GLUCOSE SERPL-MCNC: 143 MG/DL (ref 70–99)
HCT VFR BLD AUTO: 33.3 % (ref 36–48)
HGB BLD-MCNC: 11.2 G/DL (ref 12–16)
LYMPHOCYTES # BLD: 2.3 K/UL (ref 1–5.1)
LYMPHOCYTES NFR BLD: 24.5 %
MCH RBC QN AUTO: 30 PG (ref 26–34)
MCHC RBC AUTO-ENTMCNC: 33.7 G/DL (ref 31–36)
MCV RBC AUTO: 89.1 FL (ref 80–100)
MONOCYTES # BLD: 0.8 K/UL (ref 0–1.3)
MONOCYTES NFR BLD: 8.3 %
NEUTROPHILS # BLD: 6.1 K/UL (ref 1.7–7.7)
NEUTROPHILS NFR BLD: 65.4 %
PERFORMED ON: ABNORMAL
PLATELET # BLD AUTO: 224 K/UL (ref 135–450)
PMV BLD AUTO: 11.2 FL (ref 5–10.5)
POTASSIUM SERPL-SCNC: 4.5 MMOL/L (ref 3.5–5.1)
RBC # BLD AUTO: 3.73 M/UL (ref 4–5.2)
SODIUM SERPL-SCNC: 141 MMOL/L (ref 136–145)
WBC # BLD AUTO: 9.3 K/UL (ref 4–11)

## 2024-10-02 PROCEDURE — 97530 THERAPEUTIC ACTIVITIES: CPT

## 2024-10-02 PROCEDURE — 85025 COMPLETE CBC W/AUTO DIFF WBC: CPT

## 2024-10-02 PROCEDURE — 1280000000 HC REHAB R&B

## 2024-10-02 PROCEDURE — 80048 BASIC METABOLIC PNL TOTAL CA: CPT

## 2024-10-02 PROCEDURE — 97129 THER IVNTJ 1ST 15 MIN: CPT

## 2024-10-02 PROCEDURE — 6370000000 HC RX 637 (ALT 250 FOR IP): Performed by: STUDENT IN AN ORGANIZED HEALTH CARE EDUCATION/TRAINING PROGRAM

## 2024-10-02 PROCEDURE — 97535 SELF CARE MNGMENT TRAINING: CPT

## 2024-10-02 PROCEDURE — 92507 TX SP LANG VOICE COMM INDIV: CPT

## 2024-10-02 RX ORDER — INSULIN GLARGINE 100 [IU]/ML
20 INJECTION, SOLUTION SUBCUTANEOUS NIGHTLY
Status: DISCONTINUED | OUTPATIENT
Start: 2024-10-02 | End: 2024-10-03 | Stop reason: HOSPADM

## 2024-10-02 RX ADMIN — POLYETHYLENE GLYCOL 3350 17 G: 17 POWDER, FOR SOLUTION ORAL at 09:16

## 2024-10-02 RX ADMIN — VITAM B12 100 MCG: 100 TAB at 09:16

## 2024-10-02 RX ADMIN — INSULIN LISPRO 1 UNITS: 100 INJECTION, SOLUTION INTRAVENOUS; SUBCUTANEOUS at 17:39

## 2024-10-02 RX ADMIN — MONTELUKAST SODIUM 10 MG: 10 TABLET, FILM COATED ORAL at 20:28

## 2024-10-02 RX ADMIN — AMLODIPINE BESYLATE 10 MG: 5 TABLET ORAL at 09:16

## 2024-10-02 RX ADMIN — INSULIN GLARGINE 20 UNITS: 100 INJECTION, SOLUTION SUBCUTANEOUS at 22:30

## 2024-10-02 RX ADMIN — CEFDINIR 300 MG: 300 CAPSULE ORAL at 09:16

## 2024-10-02 RX ADMIN — MELATONIN TAB 3 MG 3 MG: 3 TAB at 20:28

## 2024-10-02 RX ADMIN — ATORVASTATIN CALCIUM 80 MG: 80 TABLET, FILM COATED ORAL at 20:28

## 2024-10-02 RX ADMIN — ASPIRIN 81 MG: 81 TABLET, CHEWABLE ORAL at 09:16

## 2024-10-02 RX ADMIN — INSULIN LISPRO 2 UNITS: 100 INJECTION, SOLUTION INTRAVENOUS; SUBCUTANEOUS at 12:52

## 2024-10-02 RX ADMIN — POLYETHYLENE GLYCOL 3350 17 G: 17 POWDER, FOR SOLUTION ORAL at 20:28

## 2024-10-02 RX ADMIN — LISINOPRIL 10 MG: 10 TABLET ORAL at 09:16

## 2024-10-02 RX ADMIN — CEFDINIR 300 MG: 300 CAPSULE ORAL at 20:28

## 2024-10-02 NOTE — PROGRESS NOTES
Melanie Tobin  10/2/2024  [id number]    Chief Complaint: Acute CVA (cerebrovascular accident) (HCC)    Subjective    No acute events overnight.     Patient reports that she is doing well today.  Denies any chest pain, dyspnea, arthralgia, myalgia.  Denies any fevers, chills, dysuria.  Appetite is adequate.     Last BM: Stool Occurrence: 1 (10/02/24 1000)        Objective    Patient Vitals for the past 24 hrs:   BP Temp Temp src Pulse Resp SpO2   10/02/24 0915 (!) 137/97 98.1 °F (36.7 °C) -- -- 16 --   10/01/24 1945 (!) 141/61 97.6 °F (36.4 °C) Oral 82 16 95 %     Gen: No distress, pleasant.   HEENT: Normocephalic, atraumatic.   CV: Regular rate and rhythm. Extremities warm, well perfused.   Resp: No respiratory distress. CTAB.   Abd: Soft, nontender.   Ext: No edema.   Neuro: Alert, appropriate responses to simple yes/no questions.     Laboratory data: Available via EMR.     Therapy progress:       PT    Supine to Sit: Independent  Sit to Supine: Independent   Sit to Stand: Independent  Chair/Bed to Chair Transfer: Independent  Car Transfer: Independent  Ambulation 10 ft: Independent  Ambulation 50 ft: Independent  Ambulation 150 ft: Independent  Stairs - 1 Step: Supervision or touching assistance  Stairs - 4 Step: Independent  Stairs - 12 Step: Independent    OT    Eating:    Oral Hygiene: Supervision or touching assistance  Bathing: Partial/moderate assistance  Upper Body Dressing: Setup or clean-up assistance  Lower Body Dressing: Supervision or touching assistance  Toilet Transfer: Supervision or touching assistance  Toilet Hygiene: Supervision or touching assistance    Speech Therapy    Pt presents with 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 Pt demonstrates moderate impairments in basic comprehension tasks and severe impairments in more complex comprehension tasks. Pt with error patterns in prolonged responses which required simplification and repetition. Pt

## 2024-10-02 NOTE — PROGRESS NOTES
[facility] - Inpatient Rehabilitation Department   Phone: [phone]    Occupational Therapy    [] Initial Evaluation            [] Daily Treatment Note         [x] Discharge Summary      Patient: Melanie Tobin   : 1955   MRN: [id number]   Date of Service:  10/2/2024    Admitting Diagnosis:  Acute CVA (cerebrovascular accident) (HCC)  Current Admission Summary: Melanie Tobin is a 69 y.o.  who presented to the ED on  for c/o Hyperglycemia and AMS.  Patient has a PMHx: DM II and no PSHx on file.  Upon ED evaluation patient was a poor historian with altered mental status and unable to provide any information and history was provided by spouse.   stated she was having gradual cognitive changes for several months now.  He reported patient has been having difficulty with memory and having episodes of confusion.  However, the last 4 to 5 days her confusion has gotten worse to the point where she is not making sense of her speech.  Patient was taken to the emergency department at a different hospital by sister.  Was discharged with questionable urinary tract infection.  Patient followed up with primary care physician and was placed on antibiotics for urinary tract infection.  Patient has been sleeping more per .  She has also been having elevated blood pressure, poor appetite, yet increased blood glucose levels.  Upon ED evaluation she answered her name correctly. When asked other questions she stated, \"You sound like my mom and sister, all you have into sit on the roof if you want\".  She was easily distracted and tangential.   denied she had any fever, chills, cough, chest pain, or SOB.  Blood values were significant for BUN/Cr of 27/2.03; Glucose of 246; Ammonia level 23; UA had protein of 200 glucose of 100; Toxicology was negative.  CBC was normal and CT Head did not show any acute abnormality. MRI Brain was significant for acute punctate infarcts in left frontal lobe and

## 2024-10-02 NOTE — PROGRESS NOTES
[facility] - Inpatient Rehabilitation Department   Phone: [phone]    Physical Therapy    [] Initial Evaluation            [x] Daily Treatment Note         [x] Discharge Summary      Patient: Melanie Tobin   : 1955   MRN: [id number]   Date of Service:  10/2/2024  Admitting Diagnosis: Acute CVA (cerebrovascular accident) (HCC)  Current Admission Summary: Melanie Tobin is a 69 y.o.  who presented to the ED on  for c/o Hyperglycemia and AMS.  Patient has a PMHx: DM II and no PSHx on file.  Upon ED evaluation patient was a poor historian with altered mental status and unable to provide any information and history was provided by spouse.   stated she was having gradual cognitive changes for several months now.  He reported patient has been having difficulty with memory and having episodes of confusion.  However, the last 4 to 5 days her confusion has gotten worse to the point where she is not making sense of her speech.  Patient was taken to the emergency department at a different hospital by sister.  Was discharged with questionable urinary tract infection.  Patient followed up with primary care physician and was placed on antibiotics for urinary tract infection.  Patient has been sleeping more per .  She has also been having elevated blood pressure, poor appetite, yet increased blood glucose levels.  Upon ED evaluation she answered her name correctly. When asked other questions she stated, \"You sound like my mom and sister, all you have into sit on the roof if you want\".  She was easily distracted and tangential.   denied she had any fever, chills, cough, chest pain, or SOB.  Blood values were significant for BUN/Cr of 27/2.03; Glucose of 246; Ammonia level 23; UA had protein of 200 glucose of 100; Toxicology was negative.  CBC was normal and CT Head did not show any acute abnormality. MRI Brain was significant for acute punctate infarcts in left frontal lobe and left

## 2024-10-02 NOTE — PROGRESS NOTES
responses which required simplification and repetition. Pt demonstrates moderate impairments in basic verbal expression tasks and severe impairments in more complex verbal expression tasks. Pt demonstrates accurate word production with a non-fluent pattern and impairments in word finding. Cognitive domains (attention, initiation, short term memory, insight) appear more evident and limiting her communication efforts.  Pt would benefit from ongoing participation in speech therapy services at discharge to improve comprehension and expression and maximize safety and independence in discharge location. Pt will require 24/7 supervision upon discharge due to cognitive/communication impairments limiting her safety and ability to effectively communicate in emergent situations.         Plan  Frequency: 60 minutes/day; 5 days per week, as tolerated, until goals met, or discharged from ARU.  Therapeutic Interventions:  Expressive/ Receptive Language intervention , Compensatory Cognitive intervention , and Patient/ Family education     Discharge  Barriers to discharge: May need additional assistance to manage medications and/or finances (assistance/supervision)  Inability to effectively communicate in emergent situations (ex: calling 911, stating name, reduced intelligibility, etc)  Discharge Recommendations: 24 hour supervision  Continued SLP at Discharge: Yes     Goals  Patient Goals: Pt unable to express goals    Time Frame: 2 weeks    Pt will complete auditory comprehension tasks with 80% accuracy. GOAL MET  Pt will complete expressive language tasks with 80% accuracy or min cues. GOAL MET  Pt will verbalize basic information (personal history etc.) with 80% accuracy or min cues using any communication modality. GOAL NOT MET  Patient will complete verbal description and word retrieval tasks with 80% accuracy or given min verbal cues GOAL NOT MET  Patient will demonstrate insight into limitations and impact on daily

## 2024-10-03 VITALS
HEIGHT: 66 IN | TEMPERATURE: 98.2 F | DIASTOLIC BLOOD PRESSURE: 95 MMHG | SYSTOLIC BLOOD PRESSURE: 147 MMHG | OXYGEN SATURATION: 95 % | HEART RATE: 76 BPM | WEIGHT: 240.74 LBS | RESPIRATION RATE: 18 BRPM | BODY MASS INDEX: 38.69 KG/M2

## 2024-10-03 LAB
BACTERIA UR CULT: ABNORMAL
GLUCOSE BLD-MCNC: 126 MG/DL (ref 70–99)
ORGANISM: ABNORMAL
PERFORMED ON: ABNORMAL

## 2024-10-03 PROCEDURE — 6370000000 HC RX 637 (ALT 250 FOR IP): Performed by: STUDENT IN AN ORGANIZED HEALTH CARE EDUCATION/TRAINING PROGRAM

## 2024-10-03 PROCEDURE — 0HBRXZZ EXCISION OF TOE NAIL, EXTERNAL APPROACH: ICD-10-PCS

## 2024-10-03 RX ORDER — AMLODIPINE BESYLATE 10 MG/1
10 TABLET ORAL DAILY
Qty: 30 TABLET | Refills: 0 | Status: SHIPPED | OUTPATIENT
Start: 2024-10-04

## 2024-10-03 RX ORDER — LANOLIN ALCOHOL/MO/W.PET/CERES
3 CREAM (GRAM) TOPICAL NIGHTLY PRN
COMMUNITY
Start: 2024-10-03

## 2024-10-03 RX ORDER — ATORVASTATIN CALCIUM 80 MG/1
80 TABLET, FILM COATED ORAL NIGHTLY
Qty: 30 TABLET | Refills: 0 | Status: SHIPPED | OUTPATIENT
Start: 2024-10-03

## 2024-10-03 RX ORDER — ASPIRIN 81 MG/1
81 TABLET, CHEWABLE ORAL DAILY
Qty: 30 TABLET | Refills: 0 | Status: SHIPPED | OUTPATIENT
Start: 2024-10-04

## 2024-10-03 RX ORDER — INSULIN LISPRO 100 [IU]/ML
0-4 INJECTION, SOLUTION INTRAVENOUS; SUBCUTANEOUS
Qty: 3 ADJUSTABLE DOSE PRE-FILLED PEN SYRINGE | Refills: 0 | Status: SHIPPED | OUTPATIENT
Start: 2024-10-03

## 2024-10-03 RX ORDER — INSULIN GLARGINE 100 [IU]/ML
20 INJECTION, SOLUTION SUBCUTANEOUS NIGHTLY
Qty: 6 ADJUSTABLE DOSE PRE-FILLED PEN SYRINGE | Refills: 0 | Status: SHIPPED | OUTPATIENT
Start: 2024-10-03

## 2024-10-03 RX ORDER — CEFDINIR 300 MG/1
300 CAPSULE ORAL EVERY 12 HOURS SCHEDULED
Qty: 20 CAPSULE | Refills: 0 | Status: SHIPPED | OUTPATIENT
Start: 2024-10-03 | End: 2024-10-03

## 2024-10-03 RX ORDER — LISINOPRIL 10 MG/1
10 TABLET ORAL DAILY
Qty: 30 TABLET | Refills: 0 | Status: SHIPPED | OUTPATIENT
Start: 2024-10-04

## 2024-10-03 RX ORDER — CEFDINIR 300 MG/1
300 CAPSULE ORAL EVERY 12 HOURS SCHEDULED
Qty: 6 CAPSULE | Refills: 0 | Status: SHIPPED | OUTPATIENT
Start: 2024-10-03 | End: 2024-10-06

## 2024-10-03 RX ADMIN — CEFDINIR 300 MG: 300 CAPSULE ORAL at 09:05

## 2024-10-03 RX ADMIN — VITAM B12 100 MCG: 100 TAB at 09:10

## 2024-10-03 RX ADMIN — AMLODIPINE BESYLATE 10 MG: 5 TABLET ORAL at 09:05

## 2024-10-03 RX ADMIN — LISINOPRIL 10 MG: 10 TABLET ORAL at 09:05

## 2024-10-03 RX ADMIN — ASPIRIN 81 MG: 81 TABLET, CHEWABLE ORAL at 09:06

## 2024-10-03 RX ADMIN — POLYETHYLENE GLYCOL 3350 17 G: 17 POWDER, FOR SOLUTION ORAL at 09:06

## 2024-10-03 NOTE — FLOWSHEET NOTE
D/c instructions reviewed with pt, spouse, and sister. All questions answered. Diabetic educator reviewed glucose monitor with family, heart monitor placed on chest and reviewed with family. Acknowledged understanding, update  ATB order reviewed with pt and family.

## 2024-10-03 NOTE — PLAN OF CARE
Problem: Discharge Planning  Goal: Discharge to home or other facility with appropriate resources  Outcome: Progressing     Problem: Chronic Conditions and Co-morbidities  Goal: Patient's chronic conditions and co-morbidity symptoms are monitored and maintained or improved  Outcome: Progressing     Problem: Skin/Tissue Integrity  Goal: Absence of new skin breakdown  Description: 1.  Monitor for areas of redness and/or skin breakdown  2.  Assess vascular access sites hourly  3.  Every 4-6 hours minimum:  Change oxygen saturation probe site  4.  Every 4-6 hours:  If on nasal continuous positive airway pressure, respiratory therapy assess nares and determine need for appliance change or resting period.  Outcome: Progressing     Problem: Safety - Adult  Goal: Free from fall injury  Outcome: Progressing     Problem: ABCDS Injury Assessment  Goal: Absence of physical injury  Outcome: Progressing     Problem: Confusion  Goal: Confusion, delirium, dementia, or psychosis is improved or at baseline  Description: INTERVENTIONS:  1. Assess for possible contributors to thought disturbance, including medications, impaired vision or hearing, underlying metabolic abnormalities, dehydration, psychiatric diagnoses, and notify attending LIP  2. [place] high risk fall precautions, as indicated  3. Provide frequent short contacts to provide reality reorientation, refocusing and direction  4. Decrease environmental stimuli, including noise as appropriate  5. Monitor and intervene to maintain adequate nutrition, hydration, elimination, sleep and activity  6. If unable to ensure safety without constant attention obtain sitter and review sitter guidelines with assigned personnel  7. Initiate Psychosocial CNS and Spiritual Care consult, as indicated  Outcome: Progressing     Problem: Nutrition Deficit:  Goal: Optimize nutritional status  Outcome: Progressing     Problem: Risk for Elopement  Goal: Patient will not exit the unit/facility

## 2024-10-03 NOTE — CARE COORDINATION
Case Management -  Discharge Note      Patient Name: Melanie Tobin                   YOB: 1955            Readmission Risk (Low < 19, Mod (19-27), High > 27): Readmission Risk Score: 10.5    Current PCP: Faustina Marshall MD      (IMM) Important Message from Medicare:    Has pt received appropriate IMM before discharge if required: yes  Date: 10/3/2024    PT AM-PAC:   /24  OT AM-PAC:   /24    Patient/patient representative has been educated on the benefits of HHC as well as the possible risks of declining recommended services. Patient/patient representative has acknowledged the information provided and decided on the following discharge plan. Patient/ patient representative has been provided freedom of choice regarding service provider, supported by basic dialogue that supports the patient's individualized plan of care/goals.    Home Care Information:   Is patient resuming current home health care services: No    Home Care Agency:   [facility]  [address] , [address]  [place] [address]  Phone: [phone]  Fax: [phone]             Services: PT/OT/RN/Aide/Speech  Home Health Order Obtained: Yes    Home health agency notified of discharge.      Financial    Payor: MEDICARE / Plan: MEDICARE PART A AND B / Product Type: *No Product type* /     Pharmacy:  Potential assistance Purchasing Medications: No  Meds-to-Beds request: Yes      [id number] - [place], [place] - [address] [phone] - F [phone]  [address] [address]  Phone: [phone] Fax: [phone]      Notes:    Additional Case Management Notes:  notified [facility] agency of discharge.   requested  assistant deliver IMM letter.      Electronically signed by NIYA Damico on 10/3/24 at 1:12 PM EDT

## 2024-10-03 NOTE — CONSULTS
Department of Podiatry Consult Note  Resident       Reason for Consult:  Nail dystrophy, Hx of Dm2  Requesting Physician:  Alton Torres MD     CHIEF COMPLAINT:  painful toenails     HISTORY OF PRESENT ILLNESS:                The patient is a 69 y.o. female with significant past medical history as listed below. Podiatry was consulted for nail dystrophy with hx T2DM b/l.  Patient states that her nails become painful when they are elongated and thickened and has difficulty cutting them due to her neuropathy & difficulty bending over. Patient denies fever, chills, nausea, vomiting, shortness of breath, chest pain.  Patient has no other pedal complaints at this time.    Past Medical History:        Diagnosis Date    Cerebral artery occlusion with cerebral infarction (HCC)     Diabetes mellitus (HCC)     Hyperlipidemia     Hypertension        Past Surgical History:        Procedure Laterality Date    EYE SURGERY      FRACTURE SURGERY      JOINT REPLACEMENT         Allergies:   Patient has no known allergies.    Medications:   Home Meds  No current facility-administered medications on file prior to encounter.     Current Outpatient Medications on File Prior to Encounter   Medication Sig Dispense Refill    montelukast (SINGULAIR) 10 MG tablet Take 1 tablet by mouth nightly      cetirizine (ZYRTEC) 10 MG tablet Take 1 tablet by mouth daily      Multiple Vitamins-Minerals (THERAPEUTIC MULTIVITAMIN-MINERALS) tablet Take 1 tablet by mouth daily      diclofenac sodium (VOLTAREN) 1 % GEL Place 4 g onto the skin 4 times daily 1 Tube 0       Current Meds  insulin glargine (LANTUS) injection vial 20 Units, Nightly  cefdinir (OMNICEF) capsule 300 mg, 2 times per day  lisinopril (PRINIVIL;ZESTRIL) tablet 10 mg, Daily  montelukast (SINGULAIR) tablet 10 mg, Nightly  melatonin tablet 3 mg, Nightly  traZODone (DESYREL) tablet 50 mg, Nightly PRN  amLODIPine (NORVASC) tablet 10 mg, Daily  acetaminophen (TYLENOL) tablet 650 mg, Q4H

## 2024-10-03 NOTE — PROGRESS NOTES
[facility]  Diabetes Education   Progress Note       NAME:  Melanie Tobin  MEDICAL RECORD NUMBER:  [id number]  AGE: 69 y.o.   GENDER: female  : 1955  TODAY'S DATE:  10/3/2024    Called by Natasha HARDY to assist pt with CGM placement. Spouse states pt received Dexcom G7 samples at her doctor's office and the staff there placed the first sensor. Instructed spouse on sensor insertion. Spouse successfully placed sensor to posterior DESTINY. Pt tolerated well. Spouse connected sensor to Dexcom damir. 26 minute warm up period in place when connected to damir. Pt and spouse v/u.    Electronically signed by Jaqcueline Drake RN CDCES on 10/3/2024 at 11:07 AM

## 2024-10-03 NOTE — PLAN OF CARE
Problem: Discharge Planning  Goal: Discharge to home or other facility with appropriate resources  10/3/2024 1205 by Paige Fernandez  Outcome: Completed  10/3/2024 0320 by Maribel Bruner RN  Outcome: Progressing     Problem: Chronic Conditions and Co-morbidities  Goal: Patient's chronic conditions and co-morbidity symptoms are monitored and maintained or improved  10/3/2024 1205 by Paige Fernandez  Outcome: Completed  10/3/2024 0320 by Maribel Bruner RN  Outcome: Progressing     Problem: Skin/Tissue Integrity  Goal: Absence of new skin breakdown  Description: 1.  Monitor for areas of redness and/or skin breakdown  2.  Assess vascular access sites hourly  3.  Every 4-6 hours minimum:  Change oxygen saturation probe site  4.  Every 4-6 hours:  If on nasal continuous positive airway pressure, respiratory therapy assess nares and determine need for appliance change or resting period.  10/3/2024 1205 by Paige Fernandez  Outcome: Completed  10/3/2024 0320 by Maribel Bruner RN  Outcome: Progressing     Problem: Safety - Adult  Goal: Free from fall injury  10/3/2024 1205 by Paige Fernandez  Outcome: Completed  10/3/2024 0320 by Maribel Bruner RN  Outcome: Progressing     Problem: ABCDS Injury Assessment  Goal: Absence of physical injury  10/3/2024 1205 by Piage Fernandez  Outcome: Completed  Flowsheets (Taken 10/3/2024 0323 by Maribel Bruner, RN)  Absence of Physical Injury: Implement safety measures based on patient assessment  10/3/2024 0320 by Maribel Bruner RN  Outcome: Progressing     Problem: Confusion  Goal: Confusion, delirium, dementia, or psychosis is improved or at baseline  Description: INTERVENTIONS:  1. Assess for possible contributors to thought disturbance, including medications, impaired vision or hearing, underlying metabolic abnormalities, dehydration, psychiatric diagnoses, and notify attending LIP  2. [place] high risk fall

## 2024-10-03 NOTE — DISCHARGE SUMMARY
Physical Medicine & Rehabilitation  Discharge Summary     Patient Identification:  Melanie Tobin  : 1955  Admit date: 2024  Discharge date: 10/3/2024  Attending provider: No att. providers found        Primary care provider: Faustina Marshall MD     Discharge Diagnoses:   Patient Active Problem List   Diagnosis    Chest pain    Headache    Diabetes mellitus type 2, uncontrolled    Acute CVA (cerebrovascular accident) (Formerly [facility])       Discharge Functional Status:      Physical therapy:  Supine to Sit: Independent  Sit to Supine: Independent      Sit to Stand: Independent  Chair/Bed to Chair Transfer: Independent  Car Transfer: Independent     Ambulation 10 ft: Independent  Ambulation 50 ft: Independent  Ambulation 150 ft: Independent  Stairs - 1 Step: Supervision or touching assistance  Stairs - 4 Step: Independent  Stairs - 12 Step: Independent    Occupational therapy:  Eating: Supervision or touching assistance  Oral Hygiene: Independent  Bathing: Partial/moderate assistance  Upper Body Dressing: Independent  Lower Body Dressing: Independent     Toilet Transfer: Independent  Toilet Hygiene: Independent    Speech therapy:    Pt presents with 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 Pt demonstrates moderate impairments in basic comprehension tasks and severe impairments in more complex comprehension tasks. Pt with error patterns in prolonged responses which required simplification and repetition. Pt demonstrates moderate impairments in basic verbal expression tasks and severe impairments in more complex verbal expression tasks. Pt demonstrates accurate word production with a non-fluent pattern and impairments in word finding. Cognitive domains seemingly impacted by pt's language based deficits are attention, memory, and orientation. Pt with slight improvements this date within contextual language comprehension and written expression. Pt would benefit from